# Patient Record
Sex: FEMALE | Race: BLACK OR AFRICAN AMERICAN | NOT HISPANIC OR LATINO | Employment: UNEMPLOYED | URBAN - METROPOLITAN AREA
[De-identification: names, ages, dates, MRNs, and addresses within clinical notes are randomized per-mention and may not be internally consistent; named-entity substitution may affect disease eponyms.]

---

## 2018-05-08 ENCOUNTER — HISTORICAL (OUTPATIENT)
Dept: RADIOLOGY | Facility: HOSPITAL | Age: 43
End: 2018-05-08

## 2018-07-10 ENCOUNTER — HISTORICAL (OUTPATIENT)
Dept: RADIOLOGY | Facility: HOSPITAL | Age: 43
End: 2018-07-10

## 2018-07-25 ENCOUNTER — HISTORICAL (OUTPATIENT)
Dept: PHYSICAL THERAPY | Facility: HOSPITAL | Age: 43
End: 2018-07-25

## 2018-07-27 ENCOUNTER — HISTORICAL (OUTPATIENT)
Dept: PHYSICAL THERAPY | Facility: HOSPITAL | Age: 43
End: 2018-07-27

## 2018-07-30 ENCOUNTER — HISTORICAL (OUTPATIENT)
Dept: PHYSICAL THERAPY | Facility: HOSPITAL | Age: 43
End: 2018-07-30

## 2018-08-02 ENCOUNTER — HISTORICAL (OUTPATIENT)
Dept: PHYSICAL THERAPY | Facility: HOSPITAL | Age: 43
End: 2018-08-02

## 2018-08-08 ENCOUNTER — HISTORICAL (OUTPATIENT)
Dept: PHYSICAL THERAPY | Facility: HOSPITAL | Age: 43
End: 2018-08-08

## 2018-08-10 ENCOUNTER — HISTORICAL (OUTPATIENT)
Dept: PHYSICAL THERAPY | Facility: HOSPITAL | Age: 43
End: 2018-08-10

## 2018-08-21 ENCOUNTER — HISTORICAL (OUTPATIENT)
Dept: PHYSICAL THERAPY | Facility: HOSPITAL | Age: 43
End: 2018-08-21

## 2018-10-11 ENCOUNTER — HISTORICAL (OUTPATIENT)
Dept: PHYSICAL THERAPY | Facility: HOSPITAL | Age: 43
End: 2018-10-11

## 2018-10-15 ENCOUNTER — HISTORICAL (OUTPATIENT)
Dept: PHYSICAL THERAPY | Facility: HOSPITAL | Age: 43
End: 2018-10-15

## 2018-10-18 ENCOUNTER — HISTORICAL (OUTPATIENT)
Dept: PHYSICAL THERAPY | Facility: HOSPITAL | Age: 43
End: 2018-10-18

## 2018-11-15 ENCOUNTER — HISTORICAL (OUTPATIENT)
Dept: PHYSICAL THERAPY | Facility: HOSPITAL | Age: 43
End: 2018-11-15

## 2018-11-16 ENCOUNTER — HISTORICAL (OUTPATIENT)
Dept: PHYSICAL THERAPY | Facility: HOSPITAL | Age: 43
End: 2018-11-16

## 2018-11-19 ENCOUNTER — HISTORICAL (OUTPATIENT)
Dept: PHYSICAL THERAPY | Facility: HOSPITAL | Age: 43
End: 2018-11-19

## 2018-11-27 ENCOUNTER — HISTORICAL (OUTPATIENT)
Dept: PHYSICAL THERAPY | Facility: HOSPITAL | Age: 43
End: 2018-11-27

## 2018-12-05 ENCOUNTER — HISTORICAL (OUTPATIENT)
Dept: PHYSICAL THERAPY | Facility: HOSPITAL | Age: 43
End: 2018-12-05

## 2018-12-06 ENCOUNTER — HISTORICAL (OUTPATIENT)
Dept: PHYSICAL THERAPY | Facility: HOSPITAL | Age: 43
End: 2018-12-06

## 2018-12-11 ENCOUNTER — HISTORICAL (OUTPATIENT)
Dept: PHYSICAL THERAPY | Facility: HOSPITAL | Age: 43
End: 2018-12-11

## 2019-02-15 ENCOUNTER — HISTORICAL (OUTPATIENT)
Dept: RADIOLOGY | Facility: HOSPITAL | Age: 44
End: 2019-02-15

## 2019-05-14 ENCOUNTER — HISTORICAL (OUTPATIENT)
Dept: RADIOLOGY | Facility: HOSPITAL | Age: 44
End: 2019-05-14

## 2020-06-19 ENCOUNTER — HISTORICAL (OUTPATIENT)
Dept: LAB | Facility: HOSPITAL | Age: 45
End: 2020-06-19

## 2020-06-19 LAB
ABS NEUT (OLG): 3.68 X10(3)/MCL (ref 2.1–9.2)
ALBUMIN SERPL-MCNC: 3.6 GM/DL (ref 3.4–5)
ALBUMIN/GLOB SERPL: 0.9 RATIO (ref 1.1–2)
ALP SERPL-CCNC: 92 UNIT/L (ref 46–116)
ALT SERPL-CCNC: 23 UNIT/L (ref 12–78)
AST SERPL-CCNC: 14 UNIT/L (ref 15–37)
BASOPHILS # BLD AUTO: 0 X10(3)/MCL (ref 0–0.2)
BASOPHILS NFR BLD AUTO: 0 %
BILIRUB SERPL-MCNC: 0.6 MG/DL (ref 0.2–1)
BILIRUBIN DIRECT+TOT PNL SERPL-MCNC: 0.17 MG/DL (ref 0–0.2)
BILIRUBIN DIRECT+TOT PNL SERPL-MCNC: 0.43 MG/DL (ref 0–0.8)
BUN SERPL-MCNC: 7.5 MG/DL (ref 7–18)
CALCIUM SERPL-MCNC: 9.3 MG/DL (ref 8.5–10.1)
CHLORIDE SERPL-SCNC: 103 MMOL/L (ref 98–107)
CHOLEST SERPL-MCNC: 257 MG/DL (ref 0–200)
CHOLEST/HDLC SERPL: 3.9 {RATIO} (ref 0–4)
CO2 SERPL-SCNC: 26.6 MMOL/L (ref 21–32)
CREAT SERPL-MCNC: 0.63 MG/DL (ref 0.6–1.3)
EOSINOPHIL # BLD AUTO: 0.1 X10(3)/MCL (ref 0–0.9)
EOSINOPHIL NFR BLD AUTO: 2 %
ERYTHROCYTE [DISTWIDTH] IN BLOOD BY AUTOMATED COUNT: 12.5 % (ref 11.5–17)
FT4I SERPL CALC-MCNC: 2.41
GLOBULIN SER-MCNC: 4.1 GM/DL (ref 2.4–3.5)
GLUCOSE SERPL-MCNC: 102 MG/DL (ref 74–106)
HCT VFR BLD AUTO: 41.1 % (ref 37–47)
HDLC SERPL-MCNC: 66 MG/DL (ref 40–60)
HGB BLD-MCNC: 13.3 GM/DL (ref 12–16)
IMM GRANULOCYTES # BLD AUTO: 0.01 % (ref 0–0.02)
IMM GRANULOCYTES NFR BLD AUTO: 0.2 % (ref 0–0.43)
LDLC SERPL CALC-MCNC: 161 MG/DL (ref 0–129)
LYMPHOCYTES # BLD AUTO: 2.4 X10(3)/MCL (ref 0.6–4.6)
LYMPHOCYTES NFR BLD AUTO: 37 %
MCH RBC QN AUTO: 28.8 PG (ref 27–31)
MCHC RBC AUTO-ENTMCNC: 32.4 GM/DL (ref 33–36)
MCV RBC AUTO: 89 FL (ref 80–94)
MONOCYTES # BLD AUTO: 0.3 X10(3)/MCL (ref 0.1–1.3)
MONOCYTES NFR BLD AUTO: 5 %
NEUTROPHILS # BLD AUTO: 3.68 X10(3)/MCL (ref 1.4–7.9)
NEUTROPHILS NFR BLD AUTO: 56 %
PLATELET # BLD AUTO: 288 X10(3)/MCL (ref 130–400)
PMV BLD AUTO: 9.9 FL (ref 9.4–12.4)
POTASSIUM SERPL-SCNC: 3.9 MMOL/L (ref 3.5–5.1)
PROT SERPL-MCNC: 7.7 GM/DL (ref 6.4–8.2)
RBC # BLD AUTO: 4.62 X10(6)/MCL (ref 4.2–5.4)
SODIUM SERPL-SCNC: 140 MMOL/L (ref 136–145)
T3RU NFR SERPL: 34 % (ref 31–39)
T4 SERPL-MCNC: 7.1 MCG/DL (ref 4.7–13.3)
TRIGL SERPL-MCNC: 149 MG/DL
TSH SERPL-ACNC: 1.29 MIU/ML (ref 0.36–3.74)
VLDLC SERPL CALC-MCNC: 30 MG/DL
WBC # SPEC AUTO: 6.6 X10(3)/MCL (ref 4.5–11.5)

## 2020-07-23 ENCOUNTER — HISTORICAL (OUTPATIENT)
Dept: RADIOLOGY | Facility: HOSPITAL | Age: 45
End: 2020-07-23

## 2021-07-23 ENCOUNTER — HISTORICAL (OUTPATIENT)
Dept: LAB | Facility: HOSPITAL | Age: 46
End: 2021-07-23

## 2021-07-23 LAB
CHOLEST SERPL-MCNC: 257 MG/DL
CHOLEST/HDLC SERPL: 5 {RATIO} (ref 0–5)
DEPRECATED CALCIDIOL+CALCIFEROL SERPL-MC: 11 NG/ML (ref 30–80)
EST. AVERAGE GLUCOSE BLD GHB EST-MCNC: 122.6 MG/DL
HBA1C MFR BLD: 5.9 %
HDLC SERPL-MCNC: 57 MG/DL (ref 35–60)
LDLC SERPL CALC-MCNC: 180 MG/DL (ref 50–140)
TRIGL SERPL-MCNC: 99 MG/DL (ref 37–140)
TSH SERPL-ACNC: 2.08 UIU/ML (ref 0.35–4.94)
VLDLC SERPL CALC-MCNC: 20 MG/DL

## 2022-04-10 ENCOUNTER — HISTORICAL (OUTPATIENT)
Dept: ADMINISTRATIVE | Facility: HOSPITAL | Age: 47
End: 2022-04-10
Payer: MEDICAID

## 2022-04-26 VITALS
DIASTOLIC BLOOD PRESSURE: 86 MMHG | HEIGHT: 65 IN | SYSTOLIC BLOOD PRESSURE: 125 MMHG | BODY MASS INDEX: 36.36 KG/M2 | WEIGHT: 218.25 LBS | OXYGEN SATURATION: 97 %

## 2022-09-09 DIAGNOSIS — R76.8 ANA POSITIVE: Primary | ICD-10-CM

## 2022-11-10 ENCOUNTER — TELEPHONE (OUTPATIENT)
Dept: RHEUMATOLOGY | Facility: CLINIC | Age: 47
End: 2022-11-10
Payer: MEDICAID

## 2022-11-10 NOTE — TELEPHONE ENCOUNTER
----- Message from Sue Weber sent at 11/10/2022  3:18 PM CST -----  Contact: Patient  Ramya L Nidhi would like a call back at [phone], in regards to scheduling an appointment. Pt states a referral was sent over on last week.

## 2023-02-01 DIAGNOSIS — R76.8 POSITIVE ANTINUCLEAR ANTIBODY: Primary | ICD-10-CM

## 2023-07-03 DIAGNOSIS — I10 HYPERTENSION, UNSPECIFIED TYPE: Primary | ICD-10-CM

## 2023-07-03 RX ORDER — BENAZEPRIL HYDROCHLORIDE 40 MG/1
TABLET ORAL
Qty: 30 TABLET | Refills: 0 | Status: SHIPPED | OUTPATIENT
Start: 2023-07-03 | End: 2023-08-07

## 2023-08-04 DIAGNOSIS — I10 HYPERTENSION, UNSPECIFIED TYPE: ICD-10-CM

## 2023-08-07 RX ORDER — BENAZEPRIL HYDROCHLORIDE 40 MG/1
TABLET ORAL
Qty: 30 TABLET | Refills: 0 | Status: SHIPPED | OUTPATIENT
Start: 2023-08-07 | End: 2023-09-06 | Stop reason: SDUPTHER

## 2023-08-23 DIAGNOSIS — Z00.00 WELLNESS EXAMINATION: Primary | ICD-10-CM

## 2023-09-02 ENCOUNTER — LAB VISIT (OUTPATIENT)
Dept: LAB | Facility: HOSPITAL | Age: 48
End: 2023-09-02
Attending: NURSE PRACTITIONER
Payer: MEDICAID

## 2023-09-02 DIAGNOSIS — Z00.00 WELLNESS EXAMINATION: ICD-10-CM

## 2023-09-02 LAB
ALBUMIN SERPL-MCNC: 3.5 G/DL (ref 3.5–5)
ALBUMIN/GLOB SERPL: 0.9 RATIO (ref 1.1–2)
ALP SERPL-CCNC: 94 UNIT/L (ref 40–150)
ALT SERPL-CCNC: 16 UNIT/L (ref 0–55)
AST SERPL-CCNC: 15 UNIT/L (ref 5–34)
BASOPHILS # BLD AUTO: 0.04 X10(3)/MCL
BASOPHILS NFR BLD AUTO: 0.6 %
BILIRUB SERPL-MCNC: 0.6 MG/DL
BUN SERPL-MCNC: 5.9 MG/DL (ref 7–18.7)
CALCIUM SERPL-MCNC: 9.5 MG/DL (ref 8.4–10.2)
CHLORIDE SERPL-SCNC: 103 MMOL/L (ref 98–107)
CHOLEST SERPL-MCNC: 273 MG/DL
CHOLEST/HDLC SERPL: 6 {RATIO} (ref 0–5)
CO2 SERPL-SCNC: 29 MMOL/L (ref 22–29)
CREAT SERPL-MCNC: 0.68 MG/DL (ref 0.55–1.02)
DEPRECATED CALCIDIOL+CALCIFEROL SERPL-MC: 42.2 NG/ML (ref 30–80)
EOSINOPHIL # BLD AUTO: 0.18 X10(3)/MCL (ref 0–0.9)
EOSINOPHIL NFR BLD AUTO: 2.8 %
ERYTHROCYTE [DISTWIDTH] IN BLOOD BY AUTOMATED COUNT: 12.6 % (ref 11.5–17)
EST. AVERAGE GLUCOSE BLD GHB EST-MCNC: 114 MG/DL
GFR SERPLBLD CREATININE-BSD FMLA CKD-EPI: >60 MLS/MIN/1.73/M2
GLOBULIN SER-MCNC: 4 GM/DL (ref 2.4–3.5)
GLUCOSE SERPL-MCNC: 101 MG/DL (ref 74–100)
HBA1C MFR BLD: 5.6 %
HCT VFR BLD AUTO: 39.9 % (ref 37–47)
HDLC SERPL-MCNC: 48 MG/DL (ref 35–60)
HGB BLD-MCNC: 12.6 G/DL (ref 12–16)
IMM GRANULOCYTES # BLD AUTO: 0.02 X10(3)/MCL (ref 0–0.04)
IMM GRANULOCYTES NFR BLD AUTO: 0.3 %
LDLC SERPL CALC-MCNC: 191 MG/DL (ref 50–140)
LYMPHOCYTES # BLD AUTO: 2.6 X10(3)/MCL (ref 0.6–4.6)
LYMPHOCYTES NFR BLD AUTO: 40.4 %
MCH RBC QN AUTO: 27.9 PG (ref 27–31)
MCHC RBC AUTO-ENTMCNC: 31.6 G/DL (ref 33–36)
MCV RBC AUTO: 88.5 FL (ref 80–94)
MONOCYTES # BLD AUTO: 0.33 X10(3)/MCL (ref 0.1–1.3)
MONOCYTES NFR BLD AUTO: 5.1 %
NEUTROPHILS # BLD AUTO: 3.26 X10(3)/MCL (ref 2.1–9.2)
NEUTROPHILS NFR BLD AUTO: 50.8 %
PLATELET # BLD AUTO: 286 X10(3)/MCL (ref 130–400)
PMV BLD AUTO: 10 FL (ref 7.4–10.4)
POTASSIUM SERPL-SCNC: 4 MMOL/L (ref 3.5–5.1)
PROT SERPL-MCNC: 7.5 GM/DL (ref 6.4–8.3)
RBC # BLD AUTO: 4.51 X10(6)/MCL (ref 4.2–5.4)
SODIUM SERPL-SCNC: 139 MMOL/L (ref 136–145)
TRIGL SERPL-MCNC: 171 MG/DL (ref 37–140)
TSH SERPL-ACNC: 1.9 UIU/ML (ref 0.35–4.94)
VLDLC SERPL CALC-MCNC: 34 MG/DL
WBC # SPEC AUTO: 6.43 X10(3)/MCL (ref 4.5–11.5)

## 2023-09-02 PROCEDURE — 80061 LIPID PANEL: CPT

## 2023-09-02 PROCEDURE — 85025 COMPLETE CBC W/AUTO DIFF WBC: CPT

## 2023-09-02 PROCEDURE — 80053 COMPREHEN METABOLIC PANEL: CPT

## 2023-09-02 PROCEDURE — 36415 COLL VENOUS BLD VENIPUNCTURE: CPT

## 2023-09-02 PROCEDURE — 83036 HEMOGLOBIN GLYCOSYLATED A1C: CPT

## 2023-09-02 PROCEDURE — 84443 ASSAY THYROID STIM HORMONE: CPT

## 2023-09-02 PROCEDURE — 82306 VITAMIN D 25 HYDROXY: CPT

## 2023-09-05 ENCOUNTER — OFFICE VISIT (OUTPATIENT)
Dept: FAMILY MEDICINE | Facility: CLINIC | Age: 48
End: 2023-09-05
Payer: MEDICAID

## 2023-09-05 VITALS
WEIGHT: 222.38 LBS | OXYGEN SATURATION: 97 % | RESPIRATION RATE: 18 BRPM | DIASTOLIC BLOOD PRESSURE: 89 MMHG | HEART RATE: 75 BPM | TEMPERATURE: 98 F | BODY MASS INDEX: 37.05 KG/M2 | HEIGHT: 65 IN | SYSTOLIC BLOOD PRESSURE: 147 MMHG

## 2023-09-05 DIAGNOSIS — F32.A DEPRESSION, UNSPECIFIED DEPRESSION TYPE: ICD-10-CM

## 2023-09-05 DIAGNOSIS — Z00.00 WELL ADULT EXAM: Primary | ICD-10-CM

## 2023-09-05 DIAGNOSIS — E78.2 MIXED HYPERLIPIDEMIA: ICD-10-CM

## 2023-09-05 DIAGNOSIS — Z12.31 BREAST CANCER SCREENING BY MAMMOGRAM: ICD-10-CM

## 2023-09-05 DIAGNOSIS — I10 HYPERTENSION, UNSPECIFIED TYPE: ICD-10-CM

## 2023-09-05 DIAGNOSIS — Z12.12 ENCOUNTER FOR COLORECTAL CANCER SCREENING: ICD-10-CM

## 2023-09-05 DIAGNOSIS — Z13.31 POSITIVE DEPRESSION SCREENING: ICD-10-CM

## 2023-09-05 DIAGNOSIS — Z12.11 ENCOUNTER FOR COLORECTAL CANCER SCREENING: ICD-10-CM

## 2023-09-05 DIAGNOSIS — R76.8 POSITIVE ANA (ANTINUCLEAR ANTIBODY): ICD-10-CM

## 2023-09-05 PROCEDURE — 1160F RVW MEDS BY RX/DR IN RCRD: CPT | Mod: CPTII,,, | Performed by: NURSE PRACTITIONER

## 2023-09-05 PROCEDURE — 99396 PR PREVENTIVE VISIT,EST,40-64: ICD-10-PCS | Mod: ,,, | Performed by: NURSE PRACTITIONER

## 2023-09-05 PROCEDURE — 3079F DIAST BP 80-89 MM HG: CPT | Mod: CPTII,,, | Performed by: NURSE PRACTITIONER

## 2023-09-05 PROCEDURE — 4010F ACE/ARB THERAPY RXD/TAKEN: CPT | Mod: CPTII,,, | Performed by: NURSE PRACTITIONER

## 2023-09-05 PROCEDURE — 1159F PR MEDICATION LIST DOCUMENTED IN MEDICAL RECORD: ICD-10-PCS | Mod: CPTII,,, | Performed by: NURSE PRACTITIONER

## 2023-09-05 PROCEDURE — 99396 PREV VISIT EST AGE 40-64: CPT | Mod: ,,, | Performed by: NURSE PRACTITIONER

## 2023-09-05 PROCEDURE — 3077F PR MOST RECENT SYSTOLIC BLOOD PRESSURE >= 140 MM HG: ICD-10-PCS | Mod: CPTII,,, | Performed by: NURSE PRACTITIONER

## 2023-09-05 PROCEDURE — 3044F PR MOST RECENT HEMOGLOBIN A1C LEVEL <7.0%: ICD-10-PCS | Mod: CPTII,,, | Performed by: NURSE PRACTITIONER

## 2023-09-05 PROCEDURE — 3079F PR MOST RECENT DIASTOLIC BLOOD PRESSURE 80-89 MM HG: ICD-10-PCS | Mod: CPTII,,, | Performed by: NURSE PRACTITIONER

## 2023-09-05 PROCEDURE — 3077F SYST BP >= 140 MM HG: CPT | Mod: CPTII,,, | Performed by: NURSE PRACTITIONER

## 2023-09-05 PROCEDURE — 1159F MED LIST DOCD IN RCRD: CPT | Mod: CPTII,,, | Performed by: NURSE PRACTITIONER

## 2023-09-05 PROCEDURE — 3044F HG A1C LEVEL LT 7.0%: CPT | Mod: CPTII,,, | Performed by: NURSE PRACTITIONER

## 2023-09-05 PROCEDURE — 1160F PR REVIEW ALL MEDS BY PRESCRIBER/CLIN PHARMACIST DOCUMENTED: ICD-10-PCS | Mod: CPTII,,, | Performed by: NURSE PRACTITIONER

## 2023-09-05 PROCEDURE — 4010F PR ACE/ARB THEARPY RXD/TAKEN: ICD-10-PCS | Mod: CPTII,,, | Performed by: NURSE PRACTITIONER

## 2023-09-05 PROCEDURE — 3008F PR BODY MASS INDEX (BMI) DOCUMENTED: ICD-10-PCS | Mod: CPTII,,, | Performed by: NURSE PRACTITIONER

## 2023-09-05 PROCEDURE — 3008F BODY MASS INDEX DOCD: CPT | Mod: CPTII,,, | Performed by: NURSE PRACTITIONER

## 2023-09-05 RX ORDER — MUPIROCIN 20 MG/G
OINTMENT TOPICAL 3 TIMES DAILY
Qty: 22 G | Refills: 0 | Status: SHIPPED | OUTPATIENT
Start: 2023-09-05 | End: 2023-09-10

## 2023-09-05 RX ORDER — ROSUVASTATIN CALCIUM 10 MG/1
10 TABLET, COATED ORAL NIGHTLY
Qty: 90 TABLET | Refills: 3 | Status: SHIPPED | OUTPATIENT
Start: 2023-09-05 | End: 2024-09-04

## 2023-09-05 NOTE — PROGRESS NOTES
"Patient ID: [id number]     Chief Complaint: Annual Exam      HPI:     Ramya Terrell is a 48 y.o. female here today for an annual wellness visit.  Patient reports she was referred to rheumatology in [place] by her neurologist for a positive CHRISTINE .  She states that the rheumatologist told her she is unable to treat this and will need to see someone else.       Health Maintenance   Topic Date Due    Hepatitis C Screening  Never done    TETANUS VACCINE  05/28/2001    Colorectal Cancer Screening  Never done    Mammogram  07/23/2021    Lipid Panel  09/02/2028     Dental Exam - Recommend biannually  Vaccinations -          Past Medical History:  has a past medical history of Anxiety disorder, unspecified, Depression, and Essential (primary) hypertension.    Surgical History:  has a past surgical history that includes Hysterectomy.    Family History: family history is not on file.    Social History:  reports that she has never smoked. She has never used smokeless tobacco. She reports current alcohol use of about 12.0 standard drinks of alcohol per week. She reports that she does not use drugs.        Patient is allergic to penicillins.     Patient Care Team:  Elham Richter FNP as PCP - General (Family Medicine)       Subjective:     Review of Systems    12 point review of systems conducted, negative except as stated in the history of present illness. See HPI for details.      Objective:     Visit Vitals  BP (!) 147/89 (BP Location: Left arm, Patient Position: Sitting)   Pulse 75   Temp 97.8 °F (36.6 °C) (Oral)   Resp 18   Ht 5' 4.96" (1.65 m)   Wt 100.9 kg (222 lb 6.4 oz)   LMP  (LMP Unknown)   SpO2 97%   BMI 37.05 kg/m²       Physical Exam    General: Alert and oriented, No acute distress.  Head: Normocephalic, Atraumatic.  Eye: Pupils are equal, round and reactive to light, Extraocular movements are intact, Sclera non-icteric.  Ears/Nose/Throat: Normal, Mucosa moist,Clear.  Neck/Thyroid: Supple, Non-tender, No " carotid bruit, No lymphadenopathy, No JVD, Full range of motion.  Respiratory: Clear to auscultation bilaterally; No wheezes, rales or rhonchi,Non-labored respirations, Symmetrical chest wall expansion.  Cardiovascular: Regular rate and rhythm, S1/S2 normal, No murmurs, rubs or gallops.  Gastrointestinal: Soft, Non-tender, Non-distended, Normal bowel sounds, No palpable organomegaly.  Musculoskeletal: Normal range of motion.  Integumentary: Warm, Dry, Intact, No suspicious lesions or rashes.  Extremities: No clubbing, cyanosis or edema  Neurologic: No focal deficits, Cranial Nerves II-XII are grossly intact, Motor strength normal upper and lower extremities, Sensory exam intact.  Psychiatric: Normal interaction, Coherent speech, Euthymic mood, Appropriate affect     Labs Reviewed:     Chemistry:  Lab Results   Component Value Date     09/02/2023    K 4.0 09/02/2023    CHLORIDE 103 09/02/2023    BUN 5.9 (L) 09/02/2023    CREATININE 0.68 09/02/2023    EGFRNORACEVR >60 09/02/2023    GLUCOSE 101 (H) 09/02/2023    CALCIUM 9.5 09/02/2023    ALKPHOS 94 09/02/2023    LABPROT 7.5 09/02/2023    ALBUMIN 3.5 09/02/2023    BILIDIR 0.2 05/30/2021    IBILI 0.40 05/30/2021    AST 15 09/02/2023    ALT 16 09/02/2023    ESTQAJOJ10EP 42.2 09/02/2023    TSH 1.904 09/02/2023        Lab Results   Component Value Date    HGBA1C 5.6 09/02/2023        Hematology:  Lab Results   Component Value Date    WBC 6.43 09/02/2023    HGB 12.6 09/02/2023    HCT 39.9 09/02/2023     09/02/2023       Lipid Panel:  Lab Results   Component Value Date    CHOL 273 (H) 09/02/2023    HDL 48 09/02/2023    .00 (H) 09/02/2023    TRIG 171 (H) 09/02/2023    TOTALCHOLEST 6 (H) 09/02/2023        Urine:  Lab Results   Component Value Date    APPEARANCEUA Slightly Cloudy 06/16/2020    PROTEINUA Negative 06/16/2020    LEUKOCYTESUR Negative 06/16/2020    RBCUA 0-2 06/16/2020    WBCUA 0-2 06/16/2020    BACTERIA None Seen 06/16/2020          Assessment:        ICD-10-CM ICD-9-CM   1. Well adult exam  Z00.00 V70.0   2. Hypertension, unspecified type  I10 401.9   3. Mixed hyperlipidemia  E78.2 272.2   4. Depression, unspecified depression type  F32.A 311   5. Positive depression screening  Z13.31 796.4   6. Positive CHRISTINE (antinuclear antibody)  R76.8 795.79   7. Encounter for colorectal cancer screening  Z12.11 V76.51    Z12.12 V76.41   8. Breast cancer screening by mammogram  Z12.31 V76.12        Plan:     1. Well adult exam  Healthy lifestyle discussed.  Positive support system encouraged.  Breast cancer screening ordered.  Colon cancer screening ordered.    2. Hypertension, unspecified type  Blood pressure 148/98.  Repeat blood pressure 147/89.  Patient denied take blood pressure medication yet this morning.  She advised patient to take medication upon returning home.  Return to clinic in 2 weeks for nurse visit for blood pressure recheck.  Advised patient to take medication prior to appointment.  Low Sodium Diet (DASH Diet - Less than 2 grams of sodium per day).  Monitor blood pressure daily and log. Report consistent numbers greater than 140/90.  Maintain healthy weight with goal BMI <30. Exercise 30 minutes per day, 5 days per week.  Smoking cessation encouraged to aid in BP reduction.      3. Mixed hyperlipidemia  Lab Results   Component Value Date    .00 (H) 09/02/2023    TRIG 171 (H) 09/02/2023    HDL 48 09/02/2023    TOTALCHOLEST 6 (H) 09/02/2023     Continue  Stressed importance of dietary modifications. Follow a low cholesterol, low saturated fat diet with less that 200mg of cholesterol a day.  Avoid fried foods and high saturated fats (high saturated fats less than 7% of calories).  Add Flax Seed/Fish Oil supplements to diet. Increase dietary fiber.  Regular exercise can reduce LDL and raise HDL. Stressed importance of physical activity 5 times per week for 30 minutes per day.     - rosuvastatin (CRESTOR) 10 MG tablet; Take 1 tablet (10 mg total) by  mouth every evening.  Dispense: 90 tablet; Refill: 3  - Comprehensive Metabolic Panel; Future  - Lipid Panel; Future    4. Depression, unspecified depression type  Stable.  Continue current management.  Report to ED with any suicidal or homicidal ideations.  Patient is managed by mental health.  Advised to call in scheduled follow-up appointment with mental health professional.      5. Positive depression screening  Stable.  Continue current management.  Report to ED with any suicidal or homicidal ideations.  Patient is managed by mental health.  Advised to call in scheduled follow-up appointment with mental health professional.    6. Positive CHRISTINE (antinuclear antibody)  - Ambulatory referral/consult to Rheumatology; Future    7. Encounter for colorectal cancer screening  - Ambulatory referral/consult to Gastroenterology; Future    8. Breast cancer screening by mammogram  - Mammo Digital Screening Bilat w/ [facility]; Future      Follow up in about 2 weeks (around 9/19/2023) for HTN, Nurse Visit. In addition to their scheduled follow up, the patient has also been instructed to follow up on as needed basis.     Future Appointments   Date Time Provider Department Center   9/19/2023  9:00 AM NURSE, [facility] FAMILY MEDIC[facility]   12/12/2023  9:15 AM Elham Richter FNP [facility]   9/9/2024  9:30 AM Elham Richter FNP [facility]        ELLEN Strickland

## 2023-09-06 DIAGNOSIS — I10 HYPERTENSION, UNSPECIFIED TYPE: ICD-10-CM

## 2023-09-06 RX ORDER — BENAZEPRIL HYDROCHLORIDE 40 MG/1
TABLET ORAL
Qty: 30 TABLET | Refills: 3 | Status: SHIPPED | OUTPATIENT
Start: 2023-09-06 | End: 2024-01-02

## 2023-09-27 DIAGNOSIS — Z00.00 WELL ADULT EXAM: Primary | ICD-10-CM

## 2023-09-28 ENCOUNTER — PATIENT OUTREACH (OUTPATIENT)
Dept: ADMINISTRATIVE | Facility: HOSPITAL | Age: 48
End: 2023-09-28
Payer: MEDICAID

## 2023-09-28 NOTE — PROGRESS NOTES
Population Health Outreach.    09/05/2023-referred to Dr. Don. Communication sent 9/28/23-no record of colonoscopy, only EGD report.

## 2023-09-28 NOTE — LETTER
AUTHORIZATION FOR RELEASE OF   CONFIDENTIAL INFORMATION    Dear Dr. Don,    We are seeing Ramya Terrell, date of birth 1975, in the clinic at [facility]. Elham Richter FNP is the patient's PCP. Ramya Terrell has an outstanding lab/procedure at the time we reviewed her chart. In order to help keep her health information updated, she has authorized us to request the following medical record(s):          ( X )  COLONOSCOPY?    2023-referred to Dr. Don.       Please fax records to [facility], [place], [place] D, FNP, [phone]       If you have any questions, please contact Kianna at [phone]           Patient Name: Ramya eTrrell  : 1975  Patient Phone #: [phone]

## 2023-10-09 ENCOUNTER — HOSPITAL ENCOUNTER (EMERGENCY)
Facility: HOSPITAL | Age: 48
Discharge: HOME | End: 2023-10-09
Attending: FAMILY MEDICINE
Payer: MEDICAID

## 2023-10-09 VITALS
TEMPERATURE: 99 F | SYSTOLIC BLOOD PRESSURE: 123 MMHG | DIASTOLIC BLOOD PRESSURE: 45 MMHG | HEIGHT: 64 IN | OXYGEN SATURATION: 98 % | BODY MASS INDEX: 37.56 KG/M2 | HEART RATE: 103 BPM | WEIGHT: 220 LBS | RESPIRATION RATE: 20 BRPM

## 2023-10-09 DIAGNOSIS — R06.2 WHEEZING ON BOTH SIDES OF CHEST: ICD-10-CM

## 2023-10-09 DIAGNOSIS — J06.9 VIRAL URI WITH COUGH: Primary | ICD-10-CM

## 2023-10-09 LAB
FLUAV AG UPPER RESP QL IA.RAPID: NOT DETECTED
FLUBV AG UPPER RESP QL IA.RAPID: NOT DETECTED
SARS-COV-2 RNA RESP QL NAA+PROBE: NOT DETECTED

## 2023-10-09 PROCEDURE — 25000003 PHARM REV CODE 250

## 2023-10-09 PROCEDURE — 0240U COVID/FLU A&B PCR: CPT | Performed by: FAMILY MEDICINE

## 2023-10-09 PROCEDURE — 94760 N-INVAS EAR/PLS OXIMETRY 1: CPT

## 2023-10-09 PROCEDURE — 63600175 PHARM REV CODE 636 W HCPCS

## 2023-10-09 PROCEDURE — 94640 AIRWAY INHALATION TREATMENT: CPT

## 2023-10-09 PROCEDURE — 25000242 PHARM REV CODE 250 ALT 637 W/ HCPCS

## 2023-10-09 PROCEDURE — 99284 EMERGENCY DEPT VISIT MOD MDM: CPT | Mod: 25

## 2023-10-09 RX ORDER — IPRATROPIUM BROMIDE AND ALBUTEROL SULFATE 2.5; .5 MG/3ML; MG/3ML
3 SOLUTION RESPIRATORY (INHALATION)
Status: COMPLETED | OUTPATIENT
Start: 2023-10-09 | End: 2023-10-09

## 2023-10-09 RX ORDER — ALBUTEROL SULFATE 2.5 MG/.5ML
2.5 SOLUTION RESPIRATORY (INHALATION) EVERY 4 HOURS PRN
Qty: 2 EACH | Refills: 0 | Status: SHIPPED | OUTPATIENT
Start: 2023-10-09 | End: 2023-11-08

## 2023-10-09 RX ORDER — KETOROLAC TROMETHAMINE 10 MG/1
10 TABLET, FILM COATED ORAL
Status: COMPLETED | OUTPATIENT
Start: 2023-10-09 | End: 2023-10-09

## 2023-10-09 RX ORDER — NEBULIZER AND COMPRESSOR
1 EACH MISCELLANEOUS EVERY 12 HOURS
Qty: 1 EACH | Refills: 3 | Status: SHIPPED | OUTPATIENT
Start: 2023-10-09

## 2023-10-09 RX ORDER — PREDNISONE 10 MG/1
10 TABLET ORAL DAILY
Qty: 21 TABLET | Refills: 0 | Status: SHIPPED | OUTPATIENT
Start: 2023-10-09

## 2023-10-09 RX ORDER — PREDNISONE 20 MG/1
60 TABLET ORAL
Status: COMPLETED | OUTPATIENT
Start: 2023-10-09 | End: 2023-10-09

## 2023-10-09 RX ADMIN — KETOROLAC TROMETHAMINE 10 MG: 10 TABLET, FILM COATED ORAL at 02:10

## 2023-10-09 RX ADMIN — IPRATROPIUM BROMIDE AND ALBUTEROL SULFATE 3 ML: .5; 3 SOLUTION RESPIRATORY (INHALATION) at 02:10

## 2023-10-09 RX ADMIN — PREDNISONE 60 MG: 20 TABLET ORAL at 02:10

## 2023-10-09 NOTE — DISCHARGE INSTRUCTIONS
Patient will be discharged home.  Follow up with your primary care provider for recheck.  Return ER for any worsening symptoms.  Use nebulizer machine as needed

## 2023-10-09 NOTE — ED PROVIDER NOTES
Encounter Date: 10/9/2023       History     Chief Complaint   Patient presents with    Cough     Complains of coughing, congestion, fever, and wheezing since Thursday     Cough and flu-like symptoms    The history is provided by the patient. No  was used.     Review of patient's allergies indicates:   Allergen Reactions    Penicillins      Past Medical History:   Diagnosis Date    Anxiety disorder, unspecified     Depression     Essential (primary) hypertension      Past Surgical History:   Procedure Laterality Date    HYSTERECTOMY       No family history on file.  Social History     Tobacco Use    Smoking status: Never    Smokeless tobacco: Never   Substance Use Topics    Alcohol use: Yes     Alcohol/week: 12.0 standard drinks of alcohol     Types: 12 Cans of beer per week    Drug use: Never     Review of Systems   Constitutional:  Positive for fever.   HENT:  Positive for congestion, postnasal drip, rhinorrhea, sinus pressure and sinus pain.    Eyes: Negative.    Respiratory:  Positive for cough and shortness of breath.    Cardiovascular: Negative.    Gastrointestinal: Negative.    Endocrine: Negative.    Genitourinary: Negative.    Musculoskeletal: Negative.    Skin: Negative.    Allergic/Immunologic: Negative.    Neurological: Negative.    Hematological: Negative.    Psychiatric/Behavioral: Negative.     All other systems reviewed and are negative.      Physical Exam     Initial Vitals [10/09/23 1431]   BP Pulse Resp Temp SpO2   (!) 123/45 98 20 98.9 °F (37.2 °C) 95 %      MAP       --         Physical Exam    Constitutional: She appears well-developed and well-nourished.   HENT:   Head: Normocephalic and atraumatic.   Right Ear: External ear normal.   Left Ear: External ear normal.   Nose: Nose normal.   Mouth/Throat: Oropharynx is clear and moist.   Eyes: Conjunctivae and EOM are normal. Pupils are equal, round, and reactive to light.   Neck: Neck supple.   Normal range of  motion.  Cardiovascular:  Normal rate, regular rhythm, normal heart sounds and intact distal pulses.           Pulmonary/Chest: She has wheezes. She exhibits tenderness.   Abdominal: Abdomen is soft. Bowel sounds are normal.   Musculoskeletal:         General: Normal range of motion.      Cervical back: Normal range of motion and neck supple.     Neurological: She is alert and oriented to person, place, and time. She has normal strength and normal reflexes. GCS score is 15. GCS eye subscore is 4. GCS verbal subscore is 5. GCS motor subscore is 6.   Skin: Skin is warm and dry. Capillary refill takes less than 2 seconds.   Psychiatric: She has a normal mood and affect. Her behavior is normal. Judgment and thought content normal.         ED Course   Procedures  Labs Reviewed   COVID/FLU A&B PCR - Normal    Narrative:     The Xpert Xpress SARS-CoV-2/FLU/RSV plus is a rapid, multiplexed real-time PCR test intended for the simultaneous qualitative detection and differentiation of SARS-CoV-2, Influenza A, Influenza B, and respiratory syncytial virus (RSV) viral RNA in either nasopharyngeal swab or nasal swab specimens.                Imaging Results              X-Ray Chest PA And Lateral (Final result)  Result time 10/09/23 16:14:20      Final result by Paulino Brody MD (10/09/23 16:14:20)                   Impression:      No acute chest disease is identified.      Electronically signed by: Paulino Brody  Date:    10/09/2023  Time:    16:14               Narrative:    EXAMINATION:  XR CHEST PA AND LATERAL    CLINICAL HISTORY:  , Wheezing.    COMPARISON:  May 14, 2019    FINDINGS:  No alveolar consolidation, effusion, or pneumothorax is seen.   The thoracic aorta is normal  cardiac silhouette, central pulmonary vessels and mediastinum are normal in size and are grossly unremarkable.   visualized osseous structures are grossly unremarkable.                                       Medications   predniSONE tablet 60  mg (60 mg Oral Given 10/9/23 1450)   ketorolac tablet 10 mg (10 mg Oral Given 10/9/23 1450)   albuterol-ipratropium 2.5 mg-0.5 mg/3 mL nebulizer solution 3 mL (3 mLs Nebulization Given 10/9/23 1458)     Medical Decision Making  Awake alert and oriented mild distress 48-year-old female presents ER with 3-4 day history of fever or body aches and productive cough.  Head atraumatic.  Tenderness to the maxillary frontal sinuses.  Nasal congestion.  Bilateral ear canals patent without drainage.  TMs intact.  PERRLA, EOMI.  Bilateral breath sounds with expiratory wheezing.  Cough productive.  No relief with the use of inhaler at home.  Abdomen is soft nontender.  Generalized body aches.  Moves all extremities well vital signs stable.  GCS 15 cranial nerves 2-12 intact neuro focal intact.    Diff. Dx:  Flu/Covid/Asthma Exacerbation        Amount and/or Complexity of Data Reviewed  Labs: ordered.     Details: Covid-  Radiology: ordered.     Details: Negative chest.    Risk  Prescription drug management.                               Clinical Impression:   Final diagnoses:  [R06.2] Wheezing on both sides of chest  [J06.9] Viral URI with cough (Primary)        ED Disposition Condition    Discharge Stable          ED Prescriptions       Medication Sig Dispense Start Date End Date Auth. Provider    nebulizer and compressor Yennifer 1 each by Misc.(Non-Drug; Combo Route) route every 12 (twelve) hours. 1 each 10/9/2023 -- Ruchi Barahona NP    albuterol sulfate 2.5 mg/0.5 mL Nebu Take 2.5 mg by nebulization every 4 (four) hours as needed. Rescue 2 each 10/9/2023 11/8/2023 Ruchi Barahona NP    predniSONE (DELTASONE) 10 MG tablet Take 1 tablet (10 mg total) by mouth once daily. Take 4 tabs x 3 days, then take 2 tabs x 3 days, then take 1 tab x 3 days. 21 tablet 10/9/2023 -- Ruchi Barahona NP          Follow-up Information       Follow up With Specialties Details Why Contact Elham Parson, P Family Medicine In 2 days As  needed [address]  [address]  [place] [address]  [phone]      Elham Richter, [facility]   [address]  [address]  [place] [address]  [phone]               Ruchi Barahona NP  10/09/23 1542       Ruchi Barahona NP  10/09/23 1800

## 2023-10-10 ENCOUNTER — TELEPHONE (OUTPATIENT)
Dept: FAMILY MEDICINE | Facility: CLINIC | Age: 48
End: 2023-10-10
Payer: MEDICAID

## 2023-10-10 NOTE — TELEPHONE ENCOUNTER
----- Message from Cinthia Deglado sent at 10/10/2023  2:18 PM CDT -----  Regarding: Needs Med Advice  .Type:  Needs Medical Advice    Who Called: pt  Symptoms (please be specific): ER Follow up   How long has patient had these symptoms:  coughing symptoms  Pharmacy name and phone #:  [facility] [place]   Would the patient rather a call back or a response via [facility]?   Best Call Back Number: [phone]  Additional Information: pt states she went to the ER in [facility] on 10/09 and she's not feeling any better, offered an follow up appointment on 10/12.. pt declined appointment and stated that when she's sick that I  can't get an appointment with my doctor, please advise

## 2023-10-13 ENCOUNTER — OFFICE VISIT (OUTPATIENT)
Dept: FAMILY MEDICINE | Facility: CLINIC | Age: 48
End: 2023-10-13
Payer: MEDICAID

## 2023-10-13 VITALS
DIASTOLIC BLOOD PRESSURE: 88 MMHG | HEART RATE: 88 BPM | WEIGHT: 216.69 LBS | TEMPERATURE: 98 F | BODY MASS INDEX: 36.99 KG/M2 | HEIGHT: 64 IN | SYSTOLIC BLOOD PRESSURE: 137 MMHG | OXYGEN SATURATION: 97 % | RESPIRATION RATE: 18 BRPM

## 2023-10-13 DIAGNOSIS — J01.10 ACUTE FRONTAL SINUSITIS, RECURRENCE NOT SPECIFIED: Primary | ICD-10-CM

## 2023-10-13 DIAGNOSIS — J40 BRONCHITIS: ICD-10-CM

## 2023-10-13 PROCEDURE — 3044F PR MOST RECENT HEMOGLOBIN A1C LEVEL <7.0%: ICD-10-PCS | Mod: CPTII,,, | Performed by: NURSE PRACTITIONER

## 2023-10-13 PROCEDURE — 3008F PR BODY MASS INDEX (BMI) DOCUMENTED: ICD-10-PCS | Mod: CPTII,,, | Performed by: NURSE PRACTITIONER

## 2023-10-13 PROCEDURE — 1159F PR MEDICATION LIST DOCUMENTED IN MEDICAL RECORD: ICD-10-PCS | Mod: CPTII,,, | Performed by: NURSE PRACTITIONER

## 2023-10-13 PROCEDURE — 3044F HG A1C LEVEL LT 7.0%: CPT | Mod: CPTII,,, | Performed by: NURSE PRACTITIONER

## 2023-10-13 PROCEDURE — 99213 PR OFFICE/OUTPT VISIT, EST, LEVL III, 20-29 MIN: ICD-10-PCS | Mod: ,,, | Performed by: NURSE PRACTITIONER

## 2023-10-13 PROCEDURE — 3079F PR MOST RECENT DIASTOLIC BLOOD PRESSURE 80-89 MM HG: ICD-10-PCS | Mod: CPTII,,, | Performed by: NURSE PRACTITIONER

## 2023-10-13 PROCEDURE — 3075F PR MOST RECENT SYSTOLIC BLOOD PRESS GE 130-139MM HG: ICD-10-PCS | Mod: CPTII,,, | Performed by: NURSE PRACTITIONER

## 2023-10-13 PROCEDURE — 1159F MED LIST DOCD IN RCRD: CPT | Mod: CPTII,,, | Performed by: NURSE PRACTITIONER

## 2023-10-13 PROCEDURE — 3079F DIAST BP 80-89 MM HG: CPT | Mod: CPTII,,, | Performed by: NURSE PRACTITIONER

## 2023-10-13 PROCEDURE — 4010F ACE/ARB THERAPY RXD/TAKEN: CPT | Mod: CPTII,,, | Performed by: NURSE PRACTITIONER

## 2023-10-13 PROCEDURE — 4010F PR ACE/ARB THEARPY RXD/TAKEN: ICD-10-PCS | Mod: CPTII,,, | Performed by: NURSE PRACTITIONER

## 2023-10-13 PROCEDURE — 1160F PR REVIEW ALL MEDS BY PRESCRIBER/CLIN PHARMACIST DOCUMENTED: ICD-10-PCS | Mod: CPTII,,, | Performed by: NURSE PRACTITIONER

## 2023-10-13 PROCEDURE — 1160F RVW MEDS BY RX/DR IN RCRD: CPT | Mod: CPTII,,, | Performed by: NURSE PRACTITIONER

## 2023-10-13 PROCEDURE — 3075F SYST BP GE 130 - 139MM HG: CPT | Mod: CPTII,,, | Performed by: NURSE PRACTITIONER

## 2023-10-13 PROCEDURE — 99213 OFFICE O/P EST LOW 20 MIN: CPT | Mod: ,,, | Performed by: NURSE PRACTITIONER

## 2023-10-13 PROCEDURE — 3008F BODY MASS INDEX DOCD: CPT | Mod: CPTII,,, | Performed by: NURSE PRACTITIONER

## 2023-10-13 RX ORDER — FLUCONAZOLE 150 MG/1
150 TABLET ORAL DAILY
Qty: 1 TABLET | Refills: 0 | Status: SHIPPED | OUTPATIENT
Start: 2023-10-13 | End: 2023-10-14

## 2023-10-13 RX ORDER — DEXAMETHASONE SODIUM PHOSPHATE 4 MG/ML
INJECTION, SOLUTION INTRA-ARTICULAR; INTRALESIONAL; INTRAMUSCULAR; INTRAVENOUS; SOFT TISSUE
Status: COMPLETED
Start: 2023-10-13 | End: 2023-10-13

## 2023-10-13 RX ORDER — DOXYCYCLINE 100 MG/1
100 CAPSULE ORAL 2 TIMES DAILY
Qty: 14 CAPSULE | Refills: 0 | Status: SHIPPED | OUTPATIENT
Start: 2023-10-13 | End: 2023-10-20

## 2023-10-13 RX ORDER — LEVOCETIRIZINE DIHYDROCHLORIDE 5 MG/1
5 TABLET, FILM COATED ORAL NIGHTLY
Qty: 30 TABLET | Refills: 11 | Status: SHIPPED | OUTPATIENT
Start: 2023-10-13 | End: 2024-10-12

## 2023-10-13 RX ADMIN — DEXAMETHASONE SODIUM PHOSPHATE 4 MG: 4 INJECTION, SOLUTION INTRA-ARTICULAR; INTRALESIONAL; INTRAMUSCULAR; INTRAVENOUS; SOFT TISSUE at 10:10

## 2023-10-13 NOTE — PATIENT INSTRUCTIONS
Sabino Bui,     If you are due for any health screening(s) below please notify me so we can arrange them to be ordered and scheduled. Most healthy patients at your age complete them, but you are free to accept or refuse.     If you can't do it, I'll definitely understand. If you can, I'd certainly appreciate it!    Tests to Keep You Healthy    Mammogram: ORDERED BUT NOT SCHEDULED  Colon Cancer Screening: DUE  Last Blood Pressure <= 139/89 (10/13/2023): NO      Schedule your breast cancer screening today     Breast cancer is the second most common cancer in women,  and the second leading cause of death from cancer. Mammograms can detect breast cancer early, which significantly increases the chances of curing the cancer.       Our records indicate that you may be overdue for breast cancer screening. Cancer screenings save lives, so schedule yours today to stay healthy.     If you recently had a mammogram performed outside of [facility], please let your Health care team know so that they can update your health record.        Its time for your colon cancer screening     Colorectal cancer is one of the leading causes of cancer death for men and women but it doesnt have to be. Screenings can prevent colorectal cancer or find it early enough to treat and cure the disease.     Our records indicate that you may be overdue for colon cancer screening. A colonoscopy or stool screening test can help identify patients at risk for developing colon cancer. Cancer screenings save lives, so schedule yours today to stay healthy.     A colonoscopy is the preferred test for detecting colon cancer. It is needed only once every 10 years if results are negative. While you are sedated, a flexible, lighted tube with a tiny camera is inserted into the rectum and advanced through the colon to look for cancers.     An alternative screening test that is used at home and returned to the lab may also be used. It detects hidden blood in  bowel movements which could indicate cancer in the colon. If results are positive, you will need a colonoscopy to determine if the blood is a sign of cancer. This type of follow up (diagnostic) colonoscopy usually requires additional copays as required by your insurance provider.     If you recently had your colon cancer screening performed outside of [facility], please let your Health care team know so that they can update your health record. Please contact your PCP if you have any questions.

## 2023-10-16 NOTE — PROGRESS NOTES
Patient ID: [id number]     Chief Complaint: Follow-up (C/o continued productive cough with greenish mucus that increases when laying down, wheezing, chest/stomach pain with cough, and head pressure.)      HPI:     Ramya Terrell is a 48 y.o. female here today for a follow up.  Patient reports continued productive cough with green mucus.  Patient also reports chest discomfort when coughing along with frontal sinus pressure.  Patient was seen in the ED on 10/09 and diagnosed with a viral upper respiratory infection.  Patient was prescribed a nebulizer and prednisone without improvement of symptoms.  Patient denies fever, myalgia, or shortness a breath.      Past Medical History:  has a past medical history of Anxiety disorder, unspecified, Depression, and Essential (primary) hypertension.    Surgical History:  has a past surgical history that includes Hysterectomy.    Family History: family history is not on file.    Social History:  reports that she has never smoked. She has never used smokeless tobacco. She reports current alcohol use of about 12.0 standard drinks of alcohol per week. She reports that she does not use drugs.    Current Outpatient Medications   Medication Instructions    albuterol (PROVENTIL/VENTOLIN HFA) 90 mcg/actuation inhaler INHALE TWO PUFFS BY MOUTH EVERY 6 HOURS    albuterol sulfate 2.5 mg, Nebulization, Every 4 hours PRN, Rescue    benazepriL (LOTENSIN) 40 MG tablet TAKE ONE TABLET BY MOUTH EVERY DAY .    busPIRone (BUSPAR) 5 MG Tab TAKE ONE TABLET BY MOUTH TWICE A DAY    doxycycline (VIBRAMYCIN) 100 mg, Oral, 2 times daily    levocetirizine (XYZAL) 5 mg, Oral, Nightly    nebulizer and compressor Yennifer 1 each, Misc.(Non-Drug; Combo Route), Every 12 hours    predniSONE (DELTASONE) 10 mg, Oral, Daily, Take 4 tabs x 3 days, then take 2 tabs x 3 days, then take 1 tab x 3 days.    rosuvastatin (CRESTOR) 10 mg, Oral, Nightly       Patient is allergic to penicillins.     Patient Care  "Team:  Elham Richter FNP as PCP - General (Family Medicine)       Subjective:     Review of Systems    12 point review of systems conducted, negative except as stated in the history of present illness. See HPI for details.      Objective:     Visit Vitals  /88 (BP Location: Left arm, Patient Position: Sitting)   Pulse 88   Temp 98.1 °F (36.7 °C) (Oral)   Resp 18   Ht 5' 4" (1.626 m)   Wt 98.3 kg (216 lb 11.2 oz)   LMP  (LMP Unknown)   SpO2 97%   BMI 37.20 kg/m²       Physical Exam  General: Alert and oriented, No acute distress.  Head: Normocephalic, Atraumatic.  Eye: Pupils are equal, round and reactive to light, Extraocular movements are intact, Sclera non-icteric.  Ears/Nose/Throat:  Bilateral ear effusions, posterior pharyngeal redness.  Neck/Thyroid: Supple, Non-tender, No carotid bruit, No lymphadenopathy, No JVD, Full range of motion.  Respiratory: Wheezing to anterior lung fields bilaterally; No rales or rhonchi, Non-labored respirations, Symmetrical chest wall expansion.  Cardiovascular: Regular rate and rhythm, S1/S2 normal, No murmurs, rubs or gallops.  Gastrointestinal: Soft, Non-tender, Non-distended, Normal bowel sounds, No palpable organomegaly.  Musculoskeletal: Normal range of motion.  Integumentary: Warm, Dry, Intact, No suspicious lesions or rashes.  Extremities: No clubbing, cyanosis or edema  Neurologic: No focal deficits, Cranial Nerves II-XII are grossly intact, Motor strength normal upper and lower extremities, Sensory exam intact.  Psychiatric: Normal interaction, Coherent speech, Euthymic mood, Appropriate affect     Labs Reviewed:     Chemistry:  Lab Results   Component Value Date     09/02/2023    K 4.0 09/02/2023    CHLORIDE 103 09/02/2023    BUN 5.9 (L) 09/02/2023    CREATININE 0.68 09/02/2023    EGFRNORACEVR >60 09/02/2023    GLUCOSE 101 (H) 09/02/2023    CALCIUM 9.5 09/02/2023    ALKPHOS 94 09/02/2023    LABPROT 7.5 09/02/2023    ALBUMIN 3.5 09/02/2023    BILIDIR " 0.2 05/30/2021    IBILI 0.40 05/30/2021    AST 15 09/02/2023    ALT 16 09/02/2023    CATNFPWV81OK 42.2 09/02/2023    TSH 1.904 09/02/2023        Lab Results   Component Value Date    HGBA1C 5.6 09/02/2023        Hematology:  Lab Results   Component Value Date    WBC 6.43 09/02/2023    HGB 12.6 09/02/2023    HCT 39.9 09/02/2023     09/02/2023       Lipid Panel:  Lab Results   Component Value Date    CHOL 273 (H) 09/02/2023    HDL 48 09/02/2023    .00 (H) 09/02/2023    TRIG 171 (H) 09/02/2023    TOTALCHOLEST 6 (H) 09/02/2023        Urine:  Lab Results   Component Value Date    APPEARANCEUA Slightly Cloudy 06/16/2020    PROTEINUA Negative 06/16/2020    LEUKOCYTESUR Negative 06/16/2020    RBCUA 0-2 06/16/2020    WBCUA 0-2 06/16/2020    BACTERIA None Seen 06/16/2020          Assessment:       ICD-10-CM ICD-9-CM   1. Acute frontal sinusitis, recurrence not specified  J01.10 461.1   2. Bronchitis  J40 490        Plan:   1. Acute frontal sinusitis, recurrence not specified  Doxycycline 100 mg p.o. b.i.d. x7 days sent to pharmacy.  Decadron 4 mg IM x1 given in office.  Patient tolerated well.  Levocetirizine 5 mg p.o. at bedtime sent to pharmacy.    2. Bronchitis  Decadron 4 mg IM x1 given in office.  Patient tolerated well.     Follow up if symptoms worsen or fail to improve. In addition to their scheduled follow up, the patient has also been instructed to follow up on as needed basis.     Future Appointments   Date Time Provider Department Center   12/12/2023  9:15 [facility]    7/24/2024  1:00 PM Lily Kaufman MD [facility]    9/9/2024  9:30 AM Elham Richter FNP [facility]        ELLEN Strickland

## 2023-11-21 DIAGNOSIS — R06.2 WHEEZING: Primary | ICD-10-CM

## 2023-11-21 RX ORDER — ALBUTEROL SULFATE 90 UG/1
2 AEROSOL, METERED RESPIRATORY (INHALATION)
Qty: 8.5 G | Refills: 11 | Status: SHIPPED | OUTPATIENT
Start: 2023-11-21

## 2023-11-27 DIAGNOSIS — E78.2 MIXED HYPERLIPIDEMIA: Primary | ICD-10-CM

## 2023-12-30 DIAGNOSIS — I10 HYPERTENSION, UNSPECIFIED TYPE: ICD-10-CM

## 2024-01-02 RX ORDER — BENAZEPRIL HYDROCHLORIDE 40 MG/1
TABLET ORAL
Qty: 30 TABLET | Refills: 3 | Status: SHIPPED | OUTPATIENT
Start: 2024-01-02

## 2024-04-12 ENCOUNTER — PATIENT MESSAGE (OUTPATIENT)
Dept: ADMINISTRATIVE | Facility: HOSPITAL | Age: 49
End: 2024-04-12
Payer: MEDICAID

## 2024-04-14 DIAGNOSIS — Z12.11 SCREENING FOR COLON CANCER: ICD-10-CM

## 2024-06-19 DIAGNOSIS — I10 HYPERTENSION, UNSPECIFIED TYPE: ICD-10-CM

## 2024-06-19 RX ORDER — BENAZEPRIL HYDROCHLORIDE 40 MG/1
TABLET ORAL
Qty: 30 TABLET | Refills: 3 | Status: SHIPPED | OUTPATIENT
Start: 2024-06-19

## 2024-06-21 DIAGNOSIS — E78.2 MIXED HYPERLIPIDEMIA: Primary | ICD-10-CM

## 2024-07-08 ENCOUNTER — TELEPHONE (OUTPATIENT)
Dept: FAMILY MEDICINE | Facility: CLINIC | Age: 49
End: 2024-07-08
Payer: MEDICAID

## 2024-07-08 NOTE — TELEPHONE ENCOUNTER
----- Message from Jaja Petit sent at 7/5/2024  2:55 PM CDT -----  Type:  RX Refill Request    Who Called: pt  Refill or New Rx:refill  RX Name and Strength:albuterol-ipratropium 2.5 mg-0.5 mg/3 mL nebulizer solution 3 mL  How is the patient currently taking it? (ex. 1XDay):  Is this a 30 day or 90 day RX:  Preferred Pharmacy with phone number:[id number] AT [address]  Local or Mail Order:  Ordering Provider:  Would the patient rather a call back or a response via [facility]?   Best Call Back Number:[phone]  Additional Information:

## 2024-07-11 ENCOUNTER — TELEPHONE (OUTPATIENT)
Dept: FAMILY MEDICINE | Facility: CLINIC | Age: 49
End: 2024-07-11
Payer: MEDICAID

## 2024-07-11 NOTE — TELEPHONE ENCOUNTER
Pt aware and advised she can also report to UC, after pt c/o not being able to be seen this day. Understanding voiced. .----- Message from ELLEN Strickland sent at 7/11/2024  3:39 PM CDT -----  Regarding: RE: call back  Needs appt.  ----- Message -----  From: Nyla Willett LPN  Sent: 7/11/2024   2:18 PM CDT  To: ELLEN Strickland  Subject: FW: call back                                    Pt believes she has bronchitis that has been present for a couple of weeks and is experiencing a constant cough that is worse at HS. She has been using her grand kid's nebulizer and meds to do breathing treatments. She feels she is not getting any better. She also reports difficulty breathing at HS when lying down please advise.  ----- Message -----  From: Monika Jarquin  Sent: 7/11/2024   1:59 PM CDT  To: [facility] Staff  Subject: call back                                        .Who Called: Ramya Terrell    Caller is requesting assistance/information from provider's office.    Symptoms (please be specific): constant cough   How long has patient had these symptoms:    List of preferred pharmacies on file (remove unneeded): [unfilled]  If different, enter pharmacy into here including location and phone number:       Preferred Method of Contact: Phone Call  Patient's Preferred Phone Number on File: [phone]   Best Call Back Number, if different:  Additional Information: pt requesting a call back

## 2024-07-12 ENCOUNTER — OFFICE VISIT (OUTPATIENT)
Dept: FAMILY MEDICINE | Facility: CLINIC | Age: 49
End: 2024-07-12
Payer: MEDICAID

## 2024-07-12 VITALS
RESPIRATION RATE: 18 BRPM | HEIGHT: 64 IN | DIASTOLIC BLOOD PRESSURE: 87 MMHG | SYSTOLIC BLOOD PRESSURE: 125 MMHG | TEMPERATURE: 98 F | OXYGEN SATURATION: 99 % | WEIGHT: 227.5 LBS | HEART RATE: 82 BPM | BODY MASS INDEX: 38.84 KG/M2

## 2024-07-12 DIAGNOSIS — Z12.31 BREAST CANCER SCREENING BY MAMMOGRAM: ICD-10-CM

## 2024-07-12 DIAGNOSIS — J01.10 ACUTE NON-RECURRENT FRONTAL SINUSITIS: Primary | ICD-10-CM

## 2024-07-12 PROCEDURE — 99214 OFFICE O/P EST MOD 30 MIN: CPT | Mod: ,,, | Performed by: NURSE PRACTITIONER

## 2024-07-12 PROCEDURE — 3074F SYST BP LT 130 MM HG: CPT | Mod: CPTII,,, | Performed by: NURSE PRACTITIONER

## 2024-07-12 PROCEDURE — 1159F MED LIST DOCD IN RCRD: CPT | Mod: CPTII,,, | Performed by: NURSE PRACTITIONER

## 2024-07-12 PROCEDURE — 4010F ACE/ARB THERAPY RXD/TAKEN: CPT | Mod: CPTII,,, | Performed by: NURSE PRACTITIONER

## 2024-07-12 PROCEDURE — 3079F DIAST BP 80-89 MM HG: CPT | Mod: CPTII,,, | Performed by: NURSE PRACTITIONER

## 2024-07-12 PROCEDURE — 3008F BODY MASS INDEX DOCD: CPT | Mod: CPTII,,, | Performed by: NURSE PRACTITIONER

## 2024-07-12 PROCEDURE — 1160F RVW MEDS BY RX/DR IN RCRD: CPT | Mod: CPTII,,, | Performed by: NURSE PRACTITIONER

## 2024-07-12 RX ORDER — ALBUTEROL SULFATE 0.83 MG/ML
2.5 SOLUTION RESPIRATORY (INHALATION) EVERY 4 HOURS PRN
COMMUNITY

## 2024-07-12 NOTE — PATIENT INSTRUCTIONS
Sabino Bui,     If you are due for any health screening(s) below please notify me so we can arrange them to be ordered and scheduled. Most healthy patients at your age complete them, but you are free to accept or refuse.     If you can't do it, I'll definitely understand. If you can, I'd certainly appreciate it!    Tests to Keep You Healthy    Mammogram: SCHEDULED  Colon Cancer Screening: ORDERED  Last Blood Pressure <= 139/89 (7/12/2024): Yes      Schedule your breast cancer screening today     Breast cancer is the second most common cancer in women,  and the second leading cause of death from cancer. Mammograms can detect breast cancer early, which significantly increases the chances of curing the cancer.       Our records indicate that you may be overdue for breast cancer screening. Cancer screenings save lives, so schedule yours today to stay healthy.     If you recently had a mammogram performed outside of [facility], please let your Health care team know so that they can update your health record.        Its time for your colon cancer screening     Colorectal cancer is one of the leading causes of cancer death for men and women but it doesnt have to be. Screenings can prevent colorectal cancer or find it early enough to treat and cure the disease.     Our records indicate that you may be overdue for colon cancer screening. A colonoscopy or stool screening test can help identify patients at risk for developing colon cancer. Cancer screenings save lives, so schedule yours today to stay healthy.     A colonoscopy is the preferred test for detecting colon cancer. It is needed only once every 10 years if results are negative. While you are sedated, a flexible, lighted tube with a tiny camera is inserted into the rectum and advanced through the colon to look for cancers.     An alternative screening test that is used at home and returned to the lab may also be used. It detects hidden blood in bowel  movements which could indicate cancer in the colon. If results are positive, you will need a colonoscopy to determine if the blood is a sign of cancer. This type of follow up (diagnostic) colonoscopy usually requires additional copays as required by your insurance provider.     If you recently had your colon cancer screening performed outside of [facility], please let your Health care team know so that they can update your health record. Please contact your PCP if you have any questions.

## 2024-07-16 RX ORDER — METHYLPREDNISOLONE 4 MG/1
TABLET ORAL
Qty: 21 EACH | Refills: 0 | Status: SHIPPED | OUTPATIENT
Start: 2024-07-16 | End: 2024-08-02

## 2024-07-16 RX ORDER — CHLOPHEDIANOL HCL AND PYRILAMINE MALEATE 12.5; 12.5 MG/5ML; MG/5ML
5 SOLUTION ORAL EVERY 8 HOURS PRN
Qty: 224 ML | Refills: 0 | Status: SHIPPED | OUTPATIENT
Start: 2024-07-16

## 2024-07-16 RX ORDER — CETIRIZINE HYDROCHLORIDE 10 MG/1
10 TABLET ORAL NIGHTLY
Qty: 30 TABLET | Refills: 3 | Status: SHIPPED | OUTPATIENT
Start: 2024-07-16 | End: 2025-07-16

## 2024-08-19 NOTE — PROGRESS NOTES
"   Patient ID: [id number]     Chief Complaint: Cough      HPI:     Ramya Terrell is a 49 y.o. female here today with complaints of cough, sinus pressure, headache, and ear pain x2 days.  Patient denies any chest pain, shortness a breath, fever or myalgia.    Past Medical History:  has a past medical history of Anxiety disorder, unspecified, Depression, and Essential (primary) hypertension.    Surgical History:  has a past surgical history that includes Hysterectomy.    Family History: family history is not on file.    Social History:  reports that she has never smoked. She has never used smokeless tobacco. She reports current alcohol use of about 12.0 standard drinks of alcohol per week. She reports that she does not use drugs.    Current Outpatient Medications   Medication Instructions    albuterol (PROVENTIL) 2.5 mg, Nebulization, Every 4 hours PRN, Rescue    albuterol (PROVENTIL/VENTOLIN HFA) 90 mcg/actuation inhaler 2 puffs, Inhalation    benazepriL (LOTENSIN) 40 MG tablet TAKE ONE TABLET BY MOUTH EVERY DAY .    busPIRone (BUSPAR) 5 MG Tab TAKE ONE TABLET BY MOUTH TWICE A DAY    cetirizine (ZYRTEC) 10 mg, Oral, Nightly    nebulizer and compressor Yennifer 1 each, Misc.(Non-Drug; Combo Route), Every 12 hours    pyrilamine-chlophedianoL (NINJACOF) 12.5-12.5 mg/5 mL Liqd 5 mLs, Oral, Every 8 hours PRN    rosuvastatin (CRESTOR) 10 mg, Oral, Nightly       Patient is allergic to penicillins.     Patient Care Team:  Elham Richter FNP as PCP - General (Family Medicine)       Subjective:     Review of Systems    12 point review of systems conducted, negative except as stated in the history of present illness. See HPI for details.      Objective:     Visit Vitals  /87 (BP Location: Left arm, Patient Position: Sitting)   Pulse 82   Temp 98.1 °F (36.7 °C) (Oral)   Resp 18   Ht 5' 4" (1.626 m)   Wt 103.2 kg (227 lb 8 oz)   LMP  (LMP Unknown)   SpO2 99%   BMI 39.05 kg/m²       Physical Exam    General: Alert and " oriented, No acute distress.  Head: Normocephalic, Atraumatic.  Eye: Pupils are equal, round and reactive to light, Extraocular movements are intact, Sclera non-icteric.  Ears/Nose/Throat:  Frontal and maxillary sinus tenderness, bilateral ear effusions with redness, posterior pharyngeal redness.    Neck/Thyroid: Supple, Non-tender, No carotid bruit, No lymphadenopathy, No JVD, Full range of motion.  Respiratory: Clear to auscultation bilaterally; No wheezes, rales or rhonchi,Non-labored respirations, Symmetrical chest wall expansion.  Cardiovascular: Regular rate and rhythm, S1/S2 normal, No murmurs, rubs or gallops.  Gastrointestinal: Soft, Non-tender, Non-distended, Normal bowel sounds, No palpable organomegaly.  Musculoskeletal: Normal range of motion.  Integumentary: Warm, Dry, Intact, No suspicious lesions or rashes.  Extremities: No clubbing, cyanosis or edema  Neurologic: No focal deficits, Cranial Nerves II-XII are grossly intact, Motor strength normal upper and lower extremities, Sensory exam intact.  Psychiatric: Normal interaction, Coherent speech, Euthymic mood, Appropriate affect     Labs Reviewed:     Chemistry:  Lab Results   Component Value Date     09/02/2023    K 4.0 09/02/2023    BUN 5.9 (L) 09/02/2023    CREATININE 0.68 09/02/2023    EGFRNORACEVR >60 09/02/2023    GLUCOSE 101 (H) 09/02/2023    CALCIUM 9.5 09/02/2023    ALKPHOS 94 09/02/2023    LABPROT 7.5 09/02/2023    ALBUMIN 3.5 09/02/2023    BILIDIR 0.2 05/30/2021    IBILI 0.40 05/30/2021    AST 15 09/02/2023    ALT 16 09/02/2023    GGSAAHNO10VI 42.2 09/02/2023    TSH 1.904 09/02/2023        Lab Results   Component Value Date    HGBA1C 5.6 09/02/2023        Hematology:  Lab Results   Component Value Date    WBC 6.43 09/02/2023    HGB 12.6 09/02/2023    HCT 39.9 09/02/2023     09/02/2023       Lipid Panel:  Lab Results   Component Value Date    CHOL 273 (H) 09/02/2023    HDL 48 09/02/2023    .00 (H) 09/02/2023    TRIG 171  (H) 09/02/2023    TOTALCHOLEST 6 (H) 09/02/2023        Urine:  Lab Results   Component Value Date    APPEARANCEUA Slightly Cloudy 06/16/2020    PROTEINUA Negative 06/16/2020    LEUKOCYTESUR Negative 06/16/2020    RBCUA 0-2 06/16/2020    WBCUA 0-2 06/16/2020    BACTERIA None Seen 06/16/2020          Assessment:       ICD-10-CM ICD-9-CM   1. Acute non-recurrent frontal sinusitis  J01.10 461.1   2. Breast cancer screening by mammogram  Z12.31 V76.12        Plan:   1. Acute non-recurrent frontal sinusitis  Start  Use OTC cold meds for symptoms (HTN and DM pt to avoid Sudafed or pseudoephedrine products).  Use OTC Tylenol or Advil for fever or body aches.  Get plenty of rest, eat a healthy diet, avoid alcohol and smoking.  Sore Throat: Use OTC lozenges or throat sprays, gargle with warm salt and water, warm tea with honey.  Nasal Congestion: Use OTC Mucinex D, humidifier or steamy shower.  Cough: Use Dextromethorphan/Doxylamine Cough Syrup at night.  Report fever greater than 101 F, breathing problems, painful or worsening cough, or changes in mucous (green, yellow, bloody).  Cover your mouth with coughing, avoid sharing cups or lip balm, wash your hand before eating or touching your face.    - cetirizine (ZYRTEC) 10 MG tablet; Take 1 tablet (10 mg total) by mouth every evening.  Dispense: 30 tablet; Refill: 3  - methylPREDNISolone (MEDROL DOSEPACK) 4 mg tablet; use as directed  Dispense: 21 each; Refill: 0  - pyrilamine-chlophedianoL (NINJACOF) 12.5-12.5 mg/5 mL Liqd; Take 5 mLs by mouth every 8 (eight) hours as needed (Cough).  Dispense: 224 mL; Refill: 0    2. Breast cancer screening by mammogram  - Mammo Digital Screening Bilat w/ [facility]; Future         Follow up if symptoms worsen or fail to improve. In addition to their scheduled follow up, the patient has also been instructed to follow up on as needed basis.     Future Appointments   Date Time Provider Department Center   9/9/2024  9:30 AM Elham Richter, [facility]  [facility] [place]   11/21/2024  8:00 AM Lily Kaufman MD [facility]

## 2024-09-12 ENCOUNTER — HOSPITAL ENCOUNTER (EMERGENCY)
Facility: HOSPITAL | Age: 49
Discharge: HOME | End: 2024-09-12
Attending: EMERGENCY MEDICINE
Payer: MEDICAID

## 2024-09-12 VITALS
BODY MASS INDEX: 36.65 KG/M2 | HEIGHT: 65 IN | TEMPERATURE: 98 F | WEIGHT: 220 LBS | RESPIRATION RATE: 18 BRPM | SYSTOLIC BLOOD PRESSURE: 166 MMHG | HEART RATE: 86 BPM | DIASTOLIC BLOOD PRESSURE: 125 MMHG | OXYGEN SATURATION: 98 %

## 2024-09-12 DIAGNOSIS — I10 HYPERTENSION, UNSPECIFIED TYPE: ICD-10-CM

## 2024-09-12 DIAGNOSIS — R10.10 PAIN OF UPPER ABDOMEN: Primary | ICD-10-CM

## 2024-09-12 DIAGNOSIS — R10.10 UPPER ABDOMINAL PAIN: ICD-10-CM

## 2024-09-12 LAB
ALBUMIN SERPL-MCNC: 3.8 G/DL (ref 3.5–5)
ALBUMIN/GLOB SERPL: 0.9 RATIO (ref 1.1–2)
ALP SERPL-CCNC: 122 UNIT/L (ref 40–150)
ALT SERPL-CCNC: 15 UNIT/L (ref 0–55)
ANION GAP SERPL CALC-SCNC: 9 MEQ/L
AST SERPL-CCNC: 15 UNIT/L (ref 5–34)
BACTERIA #/AREA URNS AUTO: ABNORMAL /HPF
BASOPHILS # BLD AUTO: 0.02 X10(3)/MCL
BASOPHILS NFR BLD AUTO: 0.4 %
BILIRUB SERPL-MCNC: 0.4 MG/DL
BILIRUB UR QL STRIP.AUTO: NEGATIVE
BUN SERPL-MCNC: 6.1 MG/DL (ref 7–18.7)
CALCIUM SERPL-MCNC: 9.6 MG/DL (ref 8.4–10.2)
CHLORIDE SERPL-SCNC: 104 MMOL/L (ref 98–107)
CLARITY UR: ABNORMAL
CO2 SERPL-SCNC: 28 MMOL/L (ref 22–29)
COLOR UR AUTO: ABNORMAL
CREAT SERPL-MCNC: 0.73 MG/DL (ref 0.55–1.02)
CREAT/UREA NIT SERPL: 8
EOSINOPHIL # BLD AUTO: 0.14 X10(3)/MCL (ref 0–0.9)
EOSINOPHIL NFR BLD AUTO: 2.5 %
ERYTHROCYTE [DISTWIDTH] IN BLOOD BY AUTOMATED COUNT: 12.7 % (ref 11.5–17)
GFR SERPLBLD CREATININE-BSD FMLA CKD-EPI: >60 ML/MIN/1.73/M2
GLOBULIN SER-MCNC: 4.3 GM/DL (ref 2.4–3.5)
GLUCOSE SERPL-MCNC: 97 MG/DL (ref 74–100)
GLUCOSE UR QL STRIP: NEGATIVE
HCT VFR BLD AUTO: 41.9 % (ref 37–47)
HGB BLD-MCNC: 13.3 G/DL (ref 12–16)
HGB UR QL STRIP: ABNORMAL
IMM GRANULOCYTES # BLD AUTO: 0.01 X10(3)/MCL (ref 0–0.04)
IMM GRANULOCYTES NFR BLD AUTO: 0.2 %
KETONES UR QL STRIP: NEGATIVE
LEUKOCYTE ESTERASE UR QL STRIP: NEGATIVE
LIPASE SERPL-CCNC: 23 U/L
LYMPHOCYTES # BLD AUTO: 1.9 X10(3)/MCL (ref 0.6–4.6)
LYMPHOCYTES NFR BLD AUTO: 34.2 %
MCH RBC QN AUTO: 27.6 PG (ref 27–31)
MCHC RBC AUTO-ENTMCNC: 31.7 G/DL (ref 33–36)
MCV RBC AUTO: 86.9 FL (ref 80–94)
MONOCYTES # BLD AUTO: 0.28 X10(3)/MCL (ref 0.1–1.3)
MONOCYTES NFR BLD AUTO: 5 %
NEUTROPHILS # BLD AUTO: 3.21 X10(3)/MCL (ref 2.1–9.2)
NEUTROPHILS NFR BLD AUTO: 57.7 %
NITRITE UR QL STRIP: NEGATIVE
PH UR STRIP: 6 [PH]
PLATELET # BLD AUTO: 282 X10(3)/MCL (ref 130–400)
PMV BLD AUTO: 9.6 FL (ref 7.4–10.4)
POTASSIUM SERPL-SCNC: 3.7 MMOL/L (ref 3.5–5.1)
PROT SERPL-MCNC: 8.1 GM/DL (ref 6.4–8.3)
PROT UR QL STRIP: NEGATIVE
RBC # BLD AUTO: 4.82 X10(6)/MCL (ref 4.2–5.4)
RBC #/AREA URNS AUTO: ABNORMAL /HPF
SODIUM SERPL-SCNC: 141 MMOL/L (ref 136–145)
SP GR UR STRIP.AUTO: >=1.03 (ref 1–1.03)
SQUAMOUS #/AREA URNS AUTO: ABNORMAL /HPF
TROPONIN I SERPL-MCNC: <0.01 NG/ML (ref 0–0.04)
UROBILINOGEN UR STRIP-ACNC: 0.2
WBC # BLD AUTO: 5.56 X10(3)/MCL (ref 4.5–11.5)
WBC #/AREA URNS AUTO: ABNORMAL /HPF

## 2024-09-12 PROCEDURE — 81003 URINALYSIS AUTO W/O SCOPE: CPT | Performed by: NURSE PRACTITIONER

## 2024-09-12 PROCEDURE — 99285 EMERGENCY DEPT VISIT HI MDM: CPT | Mod: 25

## 2024-09-12 PROCEDURE — 84484 ASSAY OF TROPONIN QUANT: CPT | Performed by: NURSE PRACTITIONER

## 2024-09-12 PROCEDURE — 25000003 PHARM REV CODE 250: Performed by: NURSE PRACTITIONER

## 2024-09-12 PROCEDURE — 83690 ASSAY OF LIPASE: CPT | Performed by: NURSE PRACTITIONER

## 2024-09-12 PROCEDURE — 93005 ELECTROCARDIOGRAM TRACING: CPT

## 2024-09-12 PROCEDURE — 85025 COMPLETE CBC W/AUTO DIFF WBC: CPT | Performed by: NURSE PRACTITIONER

## 2024-09-12 PROCEDURE — 80053 COMPREHEN METABOLIC PANEL: CPT | Performed by: NURSE PRACTITIONER

## 2024-09-12 PROCEDURE — 93010 ELECTROCARDIOGRAM REPORT: CPT | Mod: ,,, | Performed by: INTERNAL MEDICINE

## 2024-09-12 RX ORDER — DICYCLOMINE HYDROCHLORIDE 20 MG/1
20 TABLET ORAL EVERY 6 HOURS PRN
Qty: 20 TABLET | Refills: 0 | Status: SHIPPED | OUTPATIENT
Start: 2024-09-12 | End: 2024-09-17

## 2024-09-12 RX ORDER — DICYCLOMINE HYDROCHLORIDE 10 MG/1
20 CAPSULE ORAL
Status: COMPLETED | OUTPATIENT
Start: 2024-09-12 | End: 2024-09-12

## 2024-09-12 RX ORDER — CLONIDINE HYDROCHLORIDE 0.2 MG/1
0.2 TABLET ORAL
Status: COMPLETED | OUTPATIENT
Start: 2024-09-12 | End: 2024-09-12

## 2024-09-12 RX ADMIN — DICYCLOMINE HYDROCHLORIDE 20 MG: 10 CAPSULE ORAL at 08:09

## 2024-09-12 RX ADMIN — CLONIDINE HYDROCHLORIDE 0.2 MG: 0.2 TABLET ORAL at 08:09

## 2024-09-12 NOTE — Clinical Note
"Ramya Fischerrebekah Terrell was seen and treated in our emergency department on 9/12/2024.  She may return to work on 09/14/2024.       If you have any questions or concerns, please don't hesitate to call.      Glenis Spears FNP"

## 2024-09-13 NOTE — DISCHARGE INSTRUCTIONS
Dicyclomine 4 times a day as needed for abdominal cramping  [place] diet progressing as tolerated  Take your blood pressure medications as prescribed

## 2024-09-13 NOTE — ED PROVIDER NOTES
Encounter Date: 9/12/2024       History     Chief Complaint   Patient presents with    Abdominal Pain     PT with complaints of lower abdominal pain that started last night. She states it comes and goes about twice an hour. Complaints of mild nausea but not vomiting or diarrhea.  She thought she may have been constipated but had a normal  bm today and the pain is still there.      49-year-old female presents to ER complaining of mid epigastric abdominal pain since last night with nausea.  She denies any vomiting or diarrhea.  She denies any dysuria.  No chest pain, shortness of breath or back pain.  Patient reports pain is dull and intermittent.  She states she has been having some spasmodic episodes about twice an hour.  She does report having a normal bowel movement today.      The history is provided by the patient. No  was used.     Review of patient's allergies indicates:   Allergen Reactions    Penicillin      Other Reaction(s): Unknown    Penicillins      Past Medical History:   Diagnosis Date    Anxiety disorder, unspecified     Depression     Essential (primary) hypertension      Past Surgical History:   Procedure Laterality Date    HYSTERECTOMY       No family history on file.  Social History     Tobacco Use    Smoking status: Never    Smokeless tobacco: Never   Substance Use Topics    Alcohol use: Yes     Alcohol/week: 12.0 standard drinks of alcohol     Types: 12 Cans of beer per week    Drug use: Never     Review of Systems   Constitutional:  Negative for fever.   Respiratory:  Negative for shortness of breath.    Cardiovascular:  Negative for chest pain.   Gastrointestinal:  Positive for abdominal pain and nausea. Negative for diarrhea and vomiting.   Genitourinary:  Negative for dysuria.   Musculoskeletal:  Negative for back pain.   All other systems reviewed and are negative.      Physical Exam     Initial Vitals [09/12/24 1939]   BP Pulse Resp Temp SpO2   (!) 165/111 96 18 98 °F  (36.7 °C) 99 %      MAP       --         Physical Exam    Nursing note and vitals reviewed.  Constitutional: She appears well-developed and well-nourished.   HENT:   Head: Normocephalic.   Eyes: EOM are normal.   Neck: Neck supple.   Cardiovascular:  Normal rate and regular rhythm.           Pulmonary/Chest: Breath sounds normal. No respiratory distress.   Abdominal: Abdomen is soft. Bowel sounds are normal. There is abdominal tenderness in the left upper quadrant and left lower quadrant. There is no rebound and no guarding.   Musculoskeletal:      Cervical back: Neck supple.           ED Course   Procedures  Labs Reviewed   COMPREHENSIVE METABOLIC PANEL - Abnormal       Result Value    Sodium 141      Potassium 3.7      Chloride 104      CO2 28      Glucose 97      Blood Urea Nitrogen 6.1 (*)     Creatinine 0.73      Calcium 9.6      Protein Total 8.1      Albumin 3.8      Globulin 4.3 (*)     Albumin/Globulin Ratio 0.9 (*)     Bilirubin Total 0.4            ALT 15      AST 15      eGFR >60      Anion Gap 9.0      BUN/Creatinine Ratio 8     URINALYSIS, REFLEX TO URINE CULTURE - Abnormal    Color, UA Straw      Appearance, UA SL CLOUDY (*)     Specific Gravity, UA >=1.030      pH, UA 6.0      Protein, UA Negative      Glucose, UA Negative      Ketones, UA Negative      Blood, UA Small (*)     Bilirubin, UA Negative      Urobilinogen, UA 0.2      Nitrites, UA Negative      Leukocyte Esterase, UA Negative     CBC WITH DIFFERENTIAL - Abnormal    WBC 5.56      RBC 4.82      Hgb 13.3      Hct 41.9      MCV 86.9      MCH 27.6      MCHC 31.7 (*)     RDW 12.7      Platelet 282      MPV 9.6      Neut % 57.7      Lymph % 34.2      Mono % 5.0      Eos % 2.5      Basophil % 0.4      Lymph # 1.90      Neut # 3.21      Mono # 0.28      Eos # 0.14      Baso # 0.02      IG# 0.01      IG% 0.2     URINALYSIS, MICROSCOPIC - Abnormal    Bacteria, UA Few (*)     RBC, UA 0-5      WBC, UA 0-2      Squamous Epithelial Cells, UA Many  (*)    LIPASE - Normal    Lipase Level 23     TROPONIN I - Normal    Troponin-I <0.010     CBC W/ AUTO DIFFERENTIAL    Narrative:     The following orders were created for panel order CBC auto differential.  Procedure                               Abnormality         Status                     ---------                               -----------         ------                     CBC with Differential[[id number]]       Abnormal            Final result                 Please view results for these tests on the individual orders.     EKG Readings: (Independently Interpreted)   Initial Reading: No STEMI. Rhythm: Normal Sinus Rhythm. Heart Rate: 88. Ectopy: No Ectopy. ST Segments: Non-Specific ST Segment Depression. Clinical Impression: Normal Sinus Rhythm       Imaging Results              XR ABDOMEN  ACUTE 2 OR MORE VIEWS WITH CHEST (Preliminary result)  Result time 09/12/24 20:46:05      Wet Read by Glenis Spears FNP (09/12/24 20:46:05, [facility] Emergency Dept, Emergency Medicine)    Nonobstructive bowel gas pattern.  No cardiopulmonary processes                                     Medications   dicyclomine capsule 20 mg (has no administration in time range)   cloNIDine tablet 0.2 mg (0.2 mg Oral Given 9/12/24 2011)     Medical Decision Making  DDX: MI, bowel obstruction, cholecystitis, constipation, PUD, GERD, HTN    49-year-old female complaining of midepigastric abdominal pain with nausea that started last night.  No leukocytosis.  Normal H&H.  Urinalysis with small amount of blood but no nitrites or leukocytes.  Few bacteria.  Sodium potassium creatinine and liver enzymes within normal limits.  Lipase within normal limits.  Troponin less than 0.01.  EKG sinus rhythm without ectopy.  X-ray of the abdomen and pelvis with one-view chest without acute cardiopulmonary process, nonobstructive bowel gas pattern.  Patient be discharged home with Bentyl and instructed to follow up with her primary care.   Patient encouraged to take her blood pressure medication as prescribe      Amount and/or Complexity of Data Reviewed  Labs: ordered. Decision-making details documented in ED Course.  Radiology: ordered and independent interpretation performed. Decision-making details documented in ED Course.  ECG/medicine tests: ordered and independent interpretation performed. Decision-making details documented in ED Course.    Risk  Prescription drug management.               ED Course as of 09/12/24 2050   Thu Sep 12, 2024   2012 WBC: 5.56 [LN]   2012 Hemoglobin: 13.3 [LN]   2012 Hematocrit: 41.9 [LN]   2012 Blood, UA(!): Small [LN]   2012 NITRITE UA: Negative [LN]   2012 Leukocyte Esterase, UA: Negative [LN]   2013 Bacteria, UA(!): Few [LN]   2013 WBC, UA: 0-2 [LN]   2022 Sodium: 141 [LN]   2024 Potassium: 3.7 [LN]   2024 CO2: 28 [LN]   2024 BUN(!): 6.1 [LN]   2024 Creatinine: 0.73 [LN]   2024 ALP: 122 [LN]   2024 ALT: 15 [LN]   2024 AST: 15 [LN]   2024 eGFR: >60 [LN]   2029 Troponin I: <0.010 [LN]   2029 Lipase: 23 [LN]      ED Course User Index  [LN] Glenis Spears FNP                             Clinical Impression:  Final diagnoses:  [R10.10] Upper abdominal pain  [R10.10] Pain of upper abdomen (Primary)  [I10] Hypertension, unspecified type          ED Disposition Condition    Discharge Stable          ED Prescriptions       Medication Sig Dispense Start Date End Date Auth. Provider    dicyclomine (BENTYL) 20 mg tablet Take 1 tablet (20 mg total) by mouth every 6 (six) hours as needed (Abdominal pain). 20 tablet 9/12/2024 9/17/2024 Glenis Spears FNP          Follow-up Information    None          Glenis Spears FNP  09/12/24 2052

## 2024-09-16 LAB
OHS QRS DURATION: 84 MS
OHS QTC CALCULATION: 435 MS

## 2024-09-28 ENCOUNTER — HOSPITAL ENCOUNTER (EMERGENCY)
Facility: HOSPITAL | Age: 49
Discharge: HOME | End: 2024-09-28
Attending: SPECIALIST
Payer: MEDICAID

## 2024-09-28 VITALS
HEIGHT: 65 IN | WEIGHT: 225 LBS | BODY MASS INDEX: 37.49 KG/M2 | OXYGEN SATURATION: 97 % | TEMPERATURE: 98 F | SYSTOLIC BLOOD PRESSURE: 161 MMHG | HEART RATE: 101 BPM | RESPIRATION RATE: 18 BRPM | DIASTOLIC BLOOD PRESSURE: 116 MMHG

## 2024-09-28 DIAGNOSIS — M54.2 NECK PAIN: Primary | ICD-10-CM

## 2024-09-28 PROCEDURE — 99284 EMERGENCY DEPT VISIT MOD MDM: CPT | Mod: 25

## 2024-09-28 PROCEDURE — 63600175 PHARM REV CODE 636 W HCPCS: Performed by: SPECIALIST

## 2024-09-28 PROCEDURE — 96372 THER/PROPH/DIAG INJ SC/IM: CPT | Performed by: SPECIALIST

## 2024-09-28 RX ORDER — KETOROLAC TROMETHAMINE 30 MG/ML
60 INJECTION, SOLUTION INTRAMUSCULAR; INTRAVENOUS
Status: COMPLETED | OUTPATIENT
Start: 2024-09-28 | End: 2024-09-28

## 2024-09-28 RX ADMIN — KETOROLAC TROMETHAMINE 60 MG: 30 INJECTION, SOLUTION INTRAMUSCULAR at 05:09

## 2024-09-28 NOTE — ED PROVIDER NOTES
Encounter Date: 9/28/2024       History     Chief Complaint   Patient presents with    Neck Pain     Posterior neck pain and back pain x1-2 days. States she may have slept wrong.      49-year-old to the left side of her neck radiating down along the trapezius and scapular area, no injury noted, present for a couple of days    The history is provided by the patient.     Review of patient's allergies indicates:   Allergen Reactions    Penicillin      Other Reaction(s): Unknown    Penicillins      Past Medical History:   Diagnosis Date    Anxiety disorder, unspecified     Depression     Essential (primary) hypertension      Past Surgical History:   Procedure Laterality Date    HYSTERECTOMY       No family history on file.  Social History     Tobacco Use    Smoking status: Never    Smokeless tobacco: Never   Substance Use Topics    Alcohol use: Yes     Alcohol/week: 12.0 standard drinks of alcohol     Types: 12 Cans of beer per week    Drug use: Never     Review of Systems   Constitutional: Negative.    HENT: Negative.     Respiratory: Negative.     Cardiovascular: Negative.    Gastrointestinal: Negative.    Musculoskeletal:  Positive for arthralgias.   Skin: Negative.    Neurological: Negative.    Psychiatric/Behavioral:  Positive for dysphoric mood.    All other systems reviewed and are negative.      Physical Exam     Initial Vitals [09/28/24 0510]   BP Pulse Resp Temp SpO2   (!) 161/116 101 18 98.1 °F (36.7 °C) 97 %      MAP       --         Physical Exam    Nursing note and vitals reviewed.  Constitutional: She appears well-developed and well-nourished.   HENT:   Head: Normocephalic and atraumatic.   Eyes: EOM are normal. Pupils are equal, round, and reactive to light.   Neck: Neck supple.   Left paraspinous tenderness, mild, reduced range of motion lateral movements to the left   Normal range of motion.  Cardiovascular:  Normal rate, regular rhythm, normal heart sounds and intact distal pulses.            Pulmonary/Chest: Breath sounds normal.   Abdominal: Abdomen is soft. There is no abdominal tenderness.   Musculoskeletal:         General: Normal range of motion.      Cervical back: Normal range of motion and neck supple.     Neurological: She is alert and oriented to person, place, and time. She has normal strength. GCS score is 15. GCS eye subscore is 4. GCS verbal subscore is 5. GCS motor subscore is 6.   Skin: Skin is warm and dry.         ED Course   Procedures  Labs Reviewed - No data to display       Imaging Results              X-Ray Cervical Spine AP And Lateral (Preliminary result)  Result time 09/28/24 05:46:33      Wet Read by Davy Munoz MD (09/28/24 05:46:33, [facility] - Emergency Dept, Emergency Medicine)    No acute osseous abnormality                                     Medications   ketorolac injection 60 mg (60 mg Intramuscular Given 9/28/24 0523)     Medical Decision Making  49-year-old to the left side of her neck radiating down along the trapezius and scapular area, no injury noted, present for a couple of days    DIFFERENTIAL DIAGNOSIS- cervical strain, cervical degenerative disc disease, DJD, scapular strain    Care transitioned to Dr. Contreras at 6:00 a.m.    Amount and/or Complexity of Data Reviewed  Radiology: ordered and independent interpretation performed. Decision-making details documented in ED Course.    Risk  Prescription drug management.  Risk Details: Patient given Toradol 60 mg IM;              Attending Attestation:             Attending ED Notes:   Assumed care at shift change. Pain better. Bp elevated but hasn't taken morning meds yet.  Will f/u w pcp regarding bp                             Clinical Impression:  Final diagnoses:  [M54.2] Neck pain (Primary)          ED Disposition Condition    Discharge Stable          ED Prescriptions    None       Follow-up Information    None          Butch Contreras MD  09/28/24 0603

## 2024-10-02 ENCOUNTER — OFFICE VISIT (OUTPATIENT)
Dept: FAMILY MEDICINE | Facility: CLINIC | Age: 49
End: 2024-10-02
Payer: MEDICAID

## 2024-10-02 VITALS
HEART RATE: 100 BPM | OXYGEN SATURATION: 96 % | BODY MASS INDEX: 37.49 KG/M2 | TEMPERATURE: 98 F | WEIGHT: 225 LBS | SYSTOLIC BLOOD PRESSURE: 148 MMHG | HEIGHT: 65 IN | DIASTOLIC BLOOD PRESSURE: 108 MMHG

## 2024-10-02 DIAGNOSIS — I10 ESSENTIAL (PRIMARY) HYPERTENSION: Primary | ICD-10-CM
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
## 2024-10-02 DIAGNOSIS — Z76.0 MEDICATION REFILL: ICD-10-CM

## 2024-10-02 DIAGNOSIS — S40.261A INSECT BITE OF RIGHT SHOULDER, INITIAL ENCOUNTER: ICD-10-CM

## 2024-10-02 DIAGNOSIS — L03.90 CELLULITIS, UNSPECIFIED CELLULITIS SITE: ICD-10-CM

## 2024-10-02 RX ORDER — VORTIOXETINE 20 MG/1
1 TABLET, FILM COATED ORAL DAILY
COMMUNITY

## 2024-10-02 RX ORDER — ROSUVASTATIN CALCIUM 10 MG/1
10 TABLET, COATED ORAL NIGHTLY
Qty: 90 TABLET | Refills: 3 | Status: SHIPPED | OUTPATIENT
Start: 2024-10-02 | End: 2025-10-02

## 2024-10-02 RX ORDER — SULFAMETHOXAZOLE AND TRIMETHOPRIM 800; 160 MG/1; MG/1
1 TABLET ORAL 2 TIMES DAILY
Qty: 14 TABLET | Refills: 0 | Status: SHIPPED | OUTPATIENT
Start: 2024-10-02 | End: 2024-10-09

## 2024-10-02 RX ORDER — AMITRIPTYLINE HYDROCHLORIDE 50 MG/1
50 TABLET, FILM COATED ORAL NIGHTLY
COMMUNITY

## 2024-10-02 RX ORDER — CHOLECALCIFEROL (VITAMIN D3) 25 MCG
1000 TABLET ORAL DAILY
COMMUNITY

## 2024-10-02 RX ORDER — FREMANEZUMAB-VFRM 225 MG/1.5ML
225 INJECTION SUBCUTANEOUS
COMMUNITY

## 2024-10-02 RX ORDER — ALBUTEROL SULFATE 0.83 MG/ML
2.5 SOLUTION RESPIRATORY (INHALATION) EVERY 4 HOURS PRN
Qty: 3 ML | Refills: 11 | Status: SHIPPED | OUTPATIENT
Start: 2024-10-02

## 2024-10-02 RX ORDER — AMLODIPINE BESYLATE 5 MG/1
5 TABLET ORAL DAILY
Qty: 30 TABLET | Refills: 3 | Status: SHIPPED | OUTPATIENT
Start: 2024-10-02 | End: 2025-10-02

## 2024-10-04 NOTE — PROGRESS NOTES
Patient ID: [id number]     Chief Complaint: er outpatient f/u  (9/12/24 pain upper abdomen pain - is off and on /09/28/24 pain neck and shoulder going down her back - they gave her a shot getting better , done and xray - [facility] )      HPI:     Ramya Terrell is a 49 y.o. female here today for a follow up.  Patient presents with complaints of redness and swelling to right posterior shoulder.  Patient unsure if she was bitten by an insect.  Patient reports symptoms began yesterday.      Health Maintenance   Topic Date Due    Hepatitis C Screening  Never done    TETANUS VACCINE  05/28/2001    Colorectal Cancer Screening  Never done    Mammogram  07/23/2021    Lipid Panel  09/02/2028       Past Medical History:  has a past medical history of Anxiety disorder, unspecified, Depression, and Essential (primary) hypertension.    Surgical History:  has a past surgical history that includes Hysterectomy.    Family History: family history is not on file.    Social History:  reports that she has never smoked. She has never used smokeless tobacco. She reports current alcohol use of about 12.0 standard drinks of alcohol per week. She reports that she does not use drugs.    Current Outpatient Medications   Medication Instructions    AJOVY AUTOINJECTOR 225 mg, Every 30 days    albuterol (PROVENTIL) 2.5 mg, Nebulization, Every 4 hours PRN, Rescue    albuterol (PROVENTIL/VENTOLIN HFA) 90 mcg/actuation inhaler 2 puffs, Inhalation    amitriptyline (ELAVIL) 50 mg, Nightly    amLODIPine (NORVASC) 5 mg, Oral, Daily    benazepriL (LOTENSIN) 40 MG tablet TAKE ONE TABLET BY MOUTH EVERY DAY .    busPIRone (BUSPAR) 5 MG Tab TAKE ONE TABLET BY MOUTH TWICE A DAY    cetirizine (ZYRTEC) 10 mg, Oral, Nightly    nebulizer and compressor Yennifer 1 each, Misc.(Non-Drug; Combo Route), Every 12 hours    rosuvastatin (CRESTOR) 10 mg, Oral, Nightly    sulfamethoxazole-trimethoprim 800-160mg (BACTRIM DS) 800-160 mg Tab 1 tablet, Oral, 2 times daily     "TRINTELLIX 20 mg Tab 1 tablet, Daily    vitamin D (VITAMIN D3) 1,000 Units, Daily       Patient is allergic to penicillin and penicillins.     Patient Care Team:  Elham Richter FNP as PCP - General (Family Medicine)       Subjective:     Review of Systems    12 point review of systems conducted, negative except as stated in the history of present illness. See HPI for details.      Objective:     Visit Vitals  BP (!) 148/108   Pulse (P) 100   Temp 98.4 °F (36.9 °C)   Ht 5' 5" (1.651 m)   Wt 102.1 kg (225 lb)   LMP  (LMP Unknown)   SpO2 96%   BMI 37.44 kg/m²       Physical Exam    General: Alert and oriented, No acute distress.  Head: Normocephalic, Atraumatic.  Eye: Pupils are equal, round and reactive to light, Extraocular movements are intact, Sclera non-icteric.  Ears/Nose/Throat: Normal, Mucosa moist,Clear.  Neck/Thyroid: Supple, Non-tender, No carotid bruit, No lymphadenopathy, No JVD, Full range of motion.  Respiratory: Clear to auscultation bilaterally; No wheezes, rales or rhonchi,Non-labored respirations, Symmetrical chest wall expansion.  Cardiovascular: Regular rate and rhythm, S1/S2 normal, No murmurs, rubs or gallops.  Gastrointestinal: Soft, Non-tender, Non-distended, Normal bowel sounds, No palpable organomegaly.  Musculoskeletal: Normal range of motion.  Integumentary: Red raised area to right posterior shoulder with erythema.  No drainage noted.  Extremities: No clubbing, cyanosis or edema  Neurologic: No focal deficits, Cranial Nerves II-XII are grossly intact, Motor strength normal upper and lower extremities, Sensory exam intact.  Psychiatric: Normal interaction, Coherent speech, Euthymic mood, Appropriate affect     Labs Reviewed:     Chemistry:  Lab Results   Component Value Date     09/12/2024    K 3.7 09/12/2024    BUN 6.1 (L) 09/12/2024    CREATININE 0.73 09/12/2024    EGFRNORACEVR >60 09/12/2024    GLUCOSE 97 09/12/2024    CALCIUM 9.6 09/12/2024    ALKPHOS 122 09/12/2024    " LABPROT 8.1 09/12/2024    ALBUMIN 3.8 09/12/2024    BILIDIR 0.2 05/30/2021    IBILI 0.40 05/30/2021    AST 15 09/12/2024    ALT 15 09/12/2024    ORBHDEBG07CC 42.2 09/02/2023    TSH 1.904 09/02/2023        Lab Results   Component Value Date    HGBA1C 5.6 09/02/2023        Hematology:  Lab Results   Component Value Date    WBC 5.56 09/12/2024    HGB 13.3 09/12/2024    HCT 41.9 09/12/2024     09/12/2024       Lipid Panel:  Lab Results   Component Value Date    CHOL 273 (H) 09/02/2023    HDL 48 09/02/2023    .00 (H) 09/02/2023    TRIG 171 (H) 09/02/2023    TOTALCHOLEST 6 (H) 09/02/2023        Urine:  Lab Results   Component Value Date    APPEARANCEUA SL CLOUDY (A) 09/12/2024    SGUA >=1.030 09/12/2024    PROTEINUA Negative 09/12/2024    KETONESUA Negative 09/12/2024    LEUKOCYTESUR Negative 09/12/2024    RBCUA 0-5 09/12/2024    WBCUA 0-2 09/12/2024    BACTERIA Few (A) 09/12/2024          Assessment:       ICD-10-CM ICD-9-CM   1. Essential (primary) hypertension  I10 401.9   2. Cellulitis, unspecified cellulitis site  L03.90 682.9   3. Insect bite of right shoulder, initial encounter  S40.261A 912.4    W57.XXXA E906.4   4. Medication refill  Z76.0 V68.1        Plan:   1. Essential (primary) hypertension (Primary)  Not at goal.  Amlodipine 5 mg p.o. daily added to current regimen.  Monitor blood pressure daily.  Return to clinic in 2 weeks for blood pressure recheck.  Hypertension Medications               amLODIPine (NORVASC) 5 MG tablet Take 1 tablet (5 mg total) by mouth once daily.    benazepriL (LOTENSIN) 40 MG tablet TAKE ONE TABLET BY MOUTH EVERY DAY .        Low Sodium Diet (DASH Diet - Less than 2 grams of sodium per day).  Monitor blood pressure daily and log. Report consistent numbers greater than 140/90.  Maintain healthy weight with goal BMI <30. Exercise 30 minutes per day, 5 days per week.  Smoking cessation encouraged to aid in BP reduction.      2. Cellulitis, unspecified cellulitis  site  Take all antibiotics as prescribed.  Return to clinic if symptoms do not improve.  ED precautions.  - sulfamethoxazole-trimethoprim 800-160mg (BACTRIM DS) 800-160 mg Tab; Take 1 tablet by mouth 2 (two) times daily. for 7 days  Dispense: 14 tablet; Refill: 0    3. Insect bite of right shoulder, initial encounter  - sulfamethoxazole-trimethoprim 800-160mg (BACTRIM DS) 800-160 mg Tab; Take 1 tablet by mouth 2 (two) times daily. for 7 days  Dispense: 14 tablet; Refill: 0    4. Medication refill  - rosuvastatin (CRESTOR) 10 MG tablet; Take 1 tablet (10 mg total) by mouth every evening.  Dispense: 90 tablet; Refill: 3      Follow up in about 2 weeks (around 10/16/2024) for BP Check, Nurse Visit. In addition to their scheduled follow up, the patient has also been instructed to follow up on as needed basis.     Future Appointments   Date Time Provider Department Center   10/16/2024  9:00 AM NURSE, [facility] FAMILY MEDICINE [facility]   11/21/2024  8:00 AM Lily Kaufman MD [facility]    12/4/2024  1:00 PM Elham Richter FNP [facility]        ELLEN Strickland

## 2024-10-15 ENCOUNTER — LAB VISIT (OUTPATIENT)
Dept: LAB | Facility: HOSPITAL | Age: 49
End: 2024-10-15
Attending: NURSE PRACTITIONER
Payer: MEDICAID

## 2024-10-15 DIAGNOSIS — Z00.00 WELL ADULT EXAM: ICD-10-CM

## 2024-10-15 LAB
25(OH)D3+25(OH)D2 SERPL-MCNC: 27 NG/ML (ref 30–80)
BASOPHILS # BLD AUTO: 0.02 X10(3)/MCL
BASOPHILS NFR BLD AUTO: 0.3 %
CHOLEST SERPL-MCNC: 165 MG/DL
CHOLEST/HDLC SERPL: 3 {RATIO} (ref 0–5)
EOSINOPHIL # BLD AUTO: 0.18 X10(3)/MCL (ref 0–0.9)
EOSINOPHIL NFR BLD AUTO: 2.8 %
ERYTHROCYTE [DISTWIDTH] IN BLOOD BY AUTOMATED COUNT: 12.8 % (ref 11.5–17)
EST. AVERAGE GLUCOSE BLD GHB EST-MCNC: 125.5 MG/DL
HBA1C MFR BLD: 6 %
HCT VFR BLD AUTO: 40.3 % (ref 37–47)
HDLC SERPL-MCNC: 48 MG/DL (ref 35–60)
HGB BLD-MCNC: 12.9 G/DL (ref 12–16)
IMM GRANULOCYTES # BLD AUTO: 0.01 X10(3)/MCL (ref 0–0.04)
IMM GRANULOCYTES NFR BLD AUTO: 0.2 %
LDLC SERPL CALC-MCNC: 94 MG/DL (ref 50–140)
LYMPHOCYTES # BLD AUTO: 3.33 X10(3)/MCL (ref 0.6–4.6)
LYMPHOCYTES NFR BLD AUTO: 51.6 %
MCH RBC QN AUTO: 28 PG (ref 27–31)
MCHC RBC AUTO-ENTMCNC: 32 G/DL (ref 33–36)
MCV RBC AUTO: 87.6 FL (ref 80–94)
MONOCYTES # BLD AUTO: 0.45 X10(3)/MCL (ref 0.1–1.3)
MONOCYTES NFR BLD AUTO: 7 %
NEUTROPHILS # BLD AUTO: 2.46 X10(3)/MCL (ref 2.1–9.2)
NEUTROPHILS NFR BLD AUTO: 38.1 %
PLATELET # BLD AUTO: 292 X10(3)/MCL (ref 130–400)
PMV BLD AUTO: 9.5 FL (ref 7.4–10.4)
RBC # BLD AUTO: 4.6 X10(6)/MCL (ref 4.2–5.4)
TRIGL SERPL-MCNC: 114 MG/DL (ref 37–140)
TSH SERPL-ACNC: 2.03 UIU/ML (ref 0.35–4.94)
VLDLC SERPL CALC-MCNC: 23 MG/DL
WBC # BLD AUTO: 6.45 X10(3)/MCL (ref 4.5–11.5)

## 2024-10-15 PROCEDURE — 80061 LIPID PANEL: CPT

## 2024-10-15 PROCEDURE — 85025 COMPLETE CBC W/AUTO DIFF WBC: CPT

## 2024-10-15 PROCEDURE — 82306 VITAMIN D 25 HYDROXY: CPT

## 2024-10-15 PROCEDURE — 36415 COLL VENOUS BLD VENIPUNCTURE: CPT

## 2024-10-15 PROCEDURE — 84443 ASSAY THYROID STIM HORMONE: CPT

## 2024-10-15 PROCEDURE — 83036 HEMOGLOBIN GLYCOSYLATED A1C: CPT

## 2024-11-17 NOTE — PROGRESS NOTES
"  Patient ID: [id number]     Chief Complaint: abnormal labs (Patient here for abnormal labs. She states she has generalized inflammation to her joints. )      Referred By: No ref. provider found     HPI:     Ramya Terrell is a 49 y.o. female here today for a new patient visit.  Past medical history of chronic migraine s/p Botox injection, anxiety, hypertension, asthma.  Patient referred here due to positive CHRISTINE. Used to follow rheumatology in [place] Dr. Salo Conrad, first seen 07/24/2023, she reported that she was told she had some "inflammation on her body" and she stated her previous rheumatologist does not specialize on what she has, she was apparently told to see another rheumatologist.    Around 2016, patient was admitted in ICU after her "head popped" and later noticed she had a strip of hair missing, she was thought to have aneurysm but didn't have any based on imaging, she reportedly had "brain infection" (meningitis?), her blood pressure was elevated. Brain MRI, MRA and MRV during that time was normal. Since then, she reports memory loss and headaches associated with worsening intermittent joint pains (bilateral knees, hips, back, ankles, upper and lower back). Morning stiffness 30-40 minutes. She reported sometimes she has weak knees, and her ankles would give out. She also reports if she bends down to put on her shoes she gets dizzy. Takes ibuprofen and acetaminophen without relief. Reports snoring, poor sleep without medication, wakes up tired.     Denies history of fevers, rashes, photosensitivity, oral or nasal ulcers, h/o MI, stroke, seizures, h/o PE or DVT, Raynaud's phenomenon, uveitis, malignancies.     Family history of autoimmune disease: None    Pregnancies:  4 Miscarriages: 0    Smoking:   Social History     Tobacco Use   Smoking Status Never   Smokeless Tobacco Never   Smokes very rarely only "a puff" for unknown years.  Drinks wine 1-2 glasses a month.   Denies drug " use.       -------------------------------------    Anxiety disorder, unspecified    Depression    Essential (primary) hypertension        Past Surgical History:   Procedure Laterality Date    HYSTERECTOMY         Review of patient's allergies indicates:   Allergen Reactions    Rodeo     Penicillin      Other Reaction(s): Unknown    Penicillins        Outpatient Medications Marked as Taking for the 11/21/24 encounter (Office Visit) with Lily Kaufman MD   Medication Sig Dispense Refill    albuterol (PROVENTIL) 2.5 mg /3 mL (0.083 %) nebulizer solution Take 3 mLs (2.5 mg total) by nebulization every 4 (four) hours as needed (rescue). Rescue 3 mL 11    albuterol (PROVENTIL/VENTOLIN HFA) 90 mcg/actuation inhaler INHALE 2 PUFFS BY MOUTH EVERY 6 HOURS 8.5 g 11    amitriptyline (ELAVIL) 50 MG tablet Take 50 mg by mouth every evening.      amLODIPine (NORVASC) 5 MG tablet Take 1 tablet (5 mg total) by mouth once daily. 30 tablet 3    benazepriL (LOTENSIN) 40 MG tablet TAKE ONE TABLET BY MOUTH EVERY DAY . 30 tablet 3    busPIRone (BUSPAR) 5 MG Tab TAKE ONE TABLET BY MOUTH TWICE A DAY 60 tablet 6    cetirizine (ZYRTEC) 10 MG tablet Take 1 tablet (10 mg total) by mouth every evening. 30 tablet 3    fremanezumab-vfrm (AJOVY AUTOINJECTOR) 225 mg/1.5 mL autoinjector Inject 225 mg into the skin every 30 days.      nebulizer and compressor Yennifer 1 each by Misc.(Non-Drug; Combo Route) route every 12 (twelve) hours. 1 each 3    rosuvastatin (CRESTOR) 10 MG tablet Take 1 tablet (10 mg total) by mouth every evening. 90 tablet 3    TRINTELLIX 20 mg Tab Take 1 tablet by mouth once daily.      vitamin D (VITAMIN D3) 1000 units Tab Take 1,000 Units by mouth once daily.         Social History     Socioeconomic History    Marital status:    Occupational History     Comment: unemployed   Tobacco Use    Smoking status: Never    Smokeless tobacco: Never   Substance and Sexual Activity    Alcohol use: Yes     Alcohol/week: 12.0  standard drinks of alcohol     Types: 12 Cans of beer per week     Comment: occ    Drug use: Never    Sexual activity: Yes     Partners: Male     Birth control/protection: None     Social Drivers of Health     Financial Resource Strain: Medium Risk (10/15/2024)    Overall Financial Resource Strain (CARDIA)     Difficulty of Paying Living Expenses: Somewhat hard   Food Insecurity: No Food Insecurity (10/15/2024)    Hunger Vital Sign     Worried About Running Out of Food in the Last Year: Never true     Ran Out of Food in the Last Year: Never true   Physical Activity: Inactive (10/15/2024)    Exercise Vital Sign     Days of Exercise per Week: 2 days     Minutes of Exercise per Session: 0 min   Stress: No Stress Concern Present (10/15/2024)    [employer] - Occupational Stress Questionnaire     Feeling of Stress : Only a little   Housing Stability: Unknown (10/15/2024)    Housing Stability Vital Sign     Unable to Pay for Housing in the Last Year: No        Family History   Problem Relation Name Age of Onset    Hypertension Mother          Immunization History   Administered Date(s) Administered    COVID-19, MRNA, LN-S, PF (Pfizer) (Purple Cap) 10/07/2021, 10/28/2021    DTP 1975, 1975, 1975, 11/16/1976, 01/29/1980    Hepatitis A / Hepatitis B 04/07/2004    Influenza - Quadrivalent - PF *Preferred* (6 months and older) 01/26/2016    Influenza A (H1N1) 2009 Monovalent - IM - PF 01/27/2010    MMR 11/12/1976    OPV 1975, 1975, 1975, 11/16/1976, 01/29/1980    Td (ADULT) 05/28/1991    Td - PF (ADULT) 05/28/1991       Patient Care Team:  Elham Richter FNP as PCP - General (Family Medicine)     Subjective:     Constitutional:  Denies chills. Denies fever. Denies night sweats. Denies weight loss.   Ophthalmology: Denies blurred vision. Denies dry eyes. Denies eye pain. Denies Itching and redness.   ENT: Denies oral ulcers. Denies epistaxis. Denies dry  "mouth. Denies swollen glands.   Endocrine: Denies diabetes. Denies thyroid Problems.   Respiratory: Denies cough. Denies shortness of breath. Denies shortness of breath with exertion. Denies hemoptysis.   Cardiovascular: Denies chest pain at rest. Denies chest pain with exertion. Denies palpitations.    Gastrointestinal: Denies abdominal pain. Denies diarrhea. Denies nausea. Denies vomiting. Denies hematemesis or hematochezia. Denies heartburn.  Genitourinary: Denies blood in urine.  Musculoskeletal: See HPI for details  Integumentary: Denies rash. Denies photosensitivity.   Peripheral Vascular: Denies Ulcers of hands and/or feet. Denies Cold extremities.   Neurologic: Denies dizziness. Denies headache.  Denies loss of strength. Denies numbness or tingling.   Psychiatric: Denies depression. Denies anxiety. Denies suicidal/homicidal ideations.      Objective:     /88 (BP Location: Left arm, Patient Position: Sitting)   Pulse 91   Temp 97.8 °F (36.6 °C) (Oral)   Resp 18   Ht 5' 5" (1.651 m)   Wt 103.3 kg (227 lb 12.8 oz)   LMP  (LMP Unknown)   SpO2 98%   BMI 37.91 kg/m²     Physical Exam    General Appearance: alert, pleasant, in no acute distress.  Skin: Skin color, texture, turgor normal. No rashes or lesions.  Eyes:  extraocular movement intact (EOMI), pupils equal, round, reactive to light and accommodation, conjunctiva clear.  ENT: No oral or nasal ulcers.  Neck:  Neck supple. No adenopathy.   Lungs: CTA throughout without crackles, rhonchi, or wheezes.   Heart: RRR w/o murmurs.  No edema. 2+ intact DP/TP pulse.  Abdomen: Soft, non-tender, no masses, rebound or guarding.  Neuro: Alert, oriented, CN II-XII GI, sensory and motor innervation intact.  Musculoskeletal: Generalized pain on palpation on bilateral PIPs and MCPs with worse pain in between joints; pain on bilateral knees, upper and lower back, left hip, bilateral knees left more than right; no active redness swelling of joints  Psych: Alert, " oriented, normal eye contact.    Labs:   10/15/2024:  Vitamin-D low 27, normal white count, normal platelet count, normal hemoglobin, A1c 6.0, TSH normal    09/12/2024:  Kidney function normal, normal liver enzymes, urinalysis with small blood, negative protein    07/24/2023:  Sjogren's Ab SSA negative, SSB negative, ds DNA antibody negative, Rodriguez antibody negative, SM/RNP antibody negative, RNP antibody 1.1 positive (normal is less than 1.0), Dara 1 antibody negative, SCL 70 negative, C3 high 200, C4 high 58, ESR high 28, CRP high 32.6, RF IgG IgA IgM all negative, CCP antibody less than 16 negative, 14.3.3 ETA protein negative less than 0.2      1/25/22: CHRISTINE 1:40, Nuclear and dense fine speckled pattern. RF negative.     Imaging:   10/6/2022: Sleep study: mild obstructive sleep apnea. Recommend a titration night study or an APAP trial.    09/28/2024 X-ray cervical spine: The vertebral body heights are preserved. No acute displaced fractures identified. There is no significant spondylolisthesis. Atlantoaxial articulation appears intact. Prevertebral soft tissues appear within normal limits.     Assessment:       ICD-10-CM ICD-9-CM   1. Positive CHRISTINE (antinuclear antibody)  R76.8 795.79   2. Fibromyalgia  M79.7 729.1   3. Essential (primary) hypertension  I10 401.9   4. Hyperlipidemia, unspecified hyperlipidemia type  E78.5 272.4   5. Anxiety  F41.9 300.00   6. Depression, unspecified depression type  F32.A 311   7. Chronic intractable headache, unspecified headache type  R51.9 784.0    G89.29         Plan:     1. Positive CHRISTINE (antinuclear antibody)    2. Fibromyalgia    3. Essential (primary) hypertension    4. Hyperlipidemia, unspecified hyperlipidemia type    5. Anxiety    6. Depression, unspecified depression type    7. Chronic intractable headache, unspecified headache type         Positive CHRISTINE  -Positive CHRISTINE (antinuclear antibody):  CHRISTINE positive 1:40, Nuclear and dense fine speckled pattern. Currently does not  have any signs or symptoms of lupus. Likely false positive CHRISTINE. I will check ROMEO's. I will call with results, if ROMEO's are abnormal I will see him/her back. If ROMEO's are negative, no need to follow up with me.      Fibromyalgia  -Discussed clinical features of fibromyalgia in detail.  Fibromyalgia is a chronic pain syndrome.  Underlying causes of fibromyalgia may be numerous.  An underlying nonrestorative sleep pattern, mental and physical stressors play a role in pain perception.  -Discussed relationship of pain with sleep.  The brain functions best with a normal restful sleep that includes REM sleep.  Discussed that a nonrestorative sleep pattern may cause a decrease in pain tolerance.  Discussed that over time, this builds up and causes a cycling effect on pain.  This pain is not easily curable with pain medications or narcotics.  It is important to decrease stress levels and improving sleep patterns/hygiene.  A restorative sleep pattern is important in improving the perception of pain.  -Medications for fibromyalgia only help 10-20% of the time and come with multiple side effects. FDA recommended medications for fibromyalgia include: lyrica, cymbalta, and savella.  Other medications which have been used in fibromyalgia include amitriptyline, gabapentin, flexeril, gabapentin, and low dose naltresone.   -Exercise and a healthy life-style is important in management of this syndrome. We discussed this at length and I have recommended regular low impact exercise, preferably aquatic therapy, as well as cognitive/ behavioral therapy.      Hypertension   -Dietary and lifestyle modifications  -Continue medications  -Management per PCP    Hyperlipidemia  -Continue rosuvastatin    Anxiety/depression   -Continue medications    Migraines  -Used to follow with neurology  -On Ajovy      No follow-ups on file. In addition to their scheduled follow up, the patient has also been instructed to follow up on as needed basis.         Total time spent with patient and documentation is 60 minutes. All questions were answered to patient's satisfaction and patient verbalized understanding.        Odalys Petersen MD  [employer] Internal Medicine PGY-III

## 2024-11-21 ENCOUNTER — LAB VISIT (OUTPATIENT)
Dept: LAB | Facility: HOSPITAL | Age: 49
End: 2024-11-21
Attending: INTERNAL MEDICINE
Payer: MEDICAID

## 2024-11-21 ENCOUNTER — OFFICE VISIT (OUTPATIENT)
Dept: RHEUMATOLOGY | Facility: CLINIC | Age: 49
End: 2024-11-21
Payer: MEDICAID

## 2024-11-21 VITALS
HEART RATE: 91 BPM | RESPIRATION RATE: 18 BRPM | BODY MASS INDEX: 37.95 KG/M2 | DIASTOLIC BLOOD PRESSURE: 88 MMHG | OXYGEN SATURATION: 98 % | TEMPERATURE: 98 F | HEIGHT: 65 IN | WEIGHT: 227.81 LBS | SYSTOLIC BLOOD PRESSURE: 132 MMHG

## 2024-11-21 DIAGNOSIS — R76.8 POSITIVE ANA (ANTINUCLEAR ANTIBODY): ICD-10-CM

## 2024-11-21 DIAGNOSIS — F32.A DEPRESSION, UNSPECIFIED DEPRESSION TYPE: ICD-10-CM

## 2024-11-21 DIAGNOSIS — M79.7 FIBROMYALGIA: ICD-10-CM

## 2024-11-21 DIAGNOSIS — G89.29 CHRONIC INTRACTABLE HEADACHE, UNSPECIFIED HEADACHE TYPE: ICD-10-CM

## 2024-11-21 DIAGNOSIS — F41.9 ANXIETY: ICD-10-CM

## 2024-11-21 DIAGNOSIS — R76.8 POSITIVE ANA (ANTINUCLEAR ANTIBODY): Primary | ICD-10-CM

## 2024-11-21 DIAGNOSIS — I10 ESSENTIAL (PRIMARY) HYPERTENSION: ICD-10-CM

## 2024-11-21 DIAGNOSIS — E78.5 HYPERLIPIDEMIA, UNSPECIFIED HYPERLIPIDEMIA TYPE: ICD-10-CM

## 2024-11-21 DIAGNOSIS — R51.9 CHRONIC INTRACTABLE HEADACHE, UNSPECIFIED HEADACHE TYPE: ICD-10-CM

## 2024-11-21 DIAGNOSIS — E78.2 MIXED HYPERLIPIDEMIA: ICD-10-CM

## 2024-11-21 LAB
ALBUMIN SERPL-MCNC: 3.9 G/DL (ref 3.5–5)
ALBUMIN/GLOB SERPL: 0.9 RATIO (ref 1.1–2)
ALP SERPL-CCNC: 126 UNIT/L (ref 40–150)
ALT SERPL-CCNC: 18 UNIT/L (ref 0–55)
ANION GAP SERPL CALC-SCNC: 8 MEQ/L
AST SERPL-CCNC: 17 UNIT/L (ref 5–34)
BILIRUB SERPL-MCNC: 0.5 MG/DL
BUN SERPL-MCNC: 7.3 MG/DL (ref 7–18.7)
CALCIUM SERPL-MCNC: 9.8 MG/DL (ref 8.4–10.2)
CHLORIDE SERPL-SCNC: 104 MMOL/L (ref 98–107)
CHOLEST SERPL-MCNC: 198 MG/DL
CHOLEST/HDLC SERPL: 4 {RATIO} (ref 0–5)
CO2 SERPL-SCNC: 29 MMOL/L (ref 22–29)
CREAT SERPL-MCNC: 0.71 MG/DL (ref 0.55–1.02)
CREAT/UREA NIT SERPL: 10
GFR SERPLBLD CREATININE-BSD FMLA CKD-EPI: >60 ML/MIN/1.73/M2
GLOBULIN SER-MCNC: 4.2 GM/DL (ref 2.4–3.5)
GLUCOSE SERPL-MCNC: 114 MG/DL (ref 74–100)
HDLC SERPL-MCNC: 55 MG/DL (ref 35–60)
LDLC SERPL CALC-MCNC: 120 MG/DL (ref 50–140)
POTASSIUM SERPL-SCNC: 3.9 MMOL/L (ref 3.5–5.1)
PROT SERPL-MCNC: 8.1 GM/DL (ref 6.4–8.3)
SODIUM SERPL-SCNC: 141 MMOL/L (ref 136–145)
TRIGL SERPL-MCNC: 115 MG/DL (ref 37–140)
VLDLC SERPL CALC-MCNC: 23 MG/DL

## 2024-11-21 PROCEDURE — 1159F MED LIST DOCD IN RCRD: CPT | Mod: CPTII,,, | Performed by: INTERNAL MEDICINE

## 2024-11-21 PROCEDURE — 80053 COMPREHEN METABOLIC PANEL: CPT

## 2024-11-21 PROCEDURE — 99205 OFFICE O/P NEW HI 60 MIN: CPT | Mod: S$PBB,,, | Performed by: INTERNAL MEDICINE

## 2024-11-21 PROCEDURE — 3008F BODY MASS INDEX DOCD: CPT | Mod: CPTII,,, | Performed by: INTERNAL MEDICINE

## 2024-11-21 PROCEDURE — 99215 OFFICE O/P EST HI 40 MIN: CPT | Mod: PBBFAC | Performed by: INTERNAL MEDICINE

## 2024-11-21 PROCEDURE — 80061 LIPID PANEL: CPT

## 2024-11-21 PROCEDURE — 3075F SYST BP GE 130 - 139MM HG: CPT | Mod: CPTII,,, | Performed by: INTERNAL MEDICINE

## 2024-11-21 PROCEDURE — 4010F ACE/ARB THERAPY RXD/TAKEN: CPT | Mod: CPTII,,, | Performed by: INTERNAL MEDICINE

## 2024-11-21 PROCEDURE — 3044F HG A1C LEVEL LT 7.0%: CPT | Mod: CPTII,,, | Performed by: INTERNAL MEDICINE

## 2024-11-21 PROCEDURE — 1160F RVW MEDS BY RX/DR IN RCRD: CPT | Mod: CPTII,,, | Performed by: INTERNAL MEDICINE

## 2024-11-21 PROCEDURE — 3079F DIAST BP 80-89 MM HG: CPT | Mod: CPTII,,, | Performed by: INTERNAL MEDICINE

## 2024-11-21 PROCEDURE — 36415 COLL VENOUS BLD VENIPUNCTURE: CPT

## 2024-11-26 ENCOUNTER — TELEPHONE (OUTPATIENT)
Dept: FAMILY MEDICINE | Facility: CLINIC | Age: 49
End: 2024-11-26
Payer: MEDICAID

## 2024-12-03 ENCOUNTER — TELEPHONE (OUTPATIENT)
Dept: RHEUMATOLOGY | Facility: CLINIC | Age: 49
End: 2024-12-03
Payer: MEDICAID

## 2024-12-03 NOTE — TELEPHONE ENCOUNTER
----- Message from Aidee sent at 11/27/2024 10:19 AM CST -----  Regarding: lab results  Pt called to get lab results. Pt can be reached at  [phone]

## 2024-12-04 ENCOUNTER — HOSPITAL ENCOUNTER (OUTPATIENT)
Dept: RADIOLOGY | Facility: HOSPITAL | Age: 49
Discharge: HOME | End: 2024-12-04
Attending: NURSE PRACTITIONER
Payer: MEDICAID

## 2024-12-04 ENCOUNTER — OFFICE VISIT (OUTPATIENT)
Dept: FAMILY MEDICINE | Facility: CLINIC | Age: 49
End: 2024-12-04
Payer: MEDICAID

## 2024-12-04 VITALS
RESPIRATION RATE: 18 BRPM | TEMPERATURE: 98 F | BODY MASS INDEX: 37.82 KG/M2 | DIASTOLIC BLOOD PRESSURE: 82 MMHG | HEIGHT: 65 IN | WEIGHT: 227 LBS | OXYGEN SATURATION: 99 % | SYSTOLIC BLOOD PRESSURE: 117 MMHG | HEART RATE: 99 BPM

## 2024-12-04 DIAGNOSIS — M54.50 CHRONIC BILATERAL LOW BACK PAIN WITHOUT SCIATICA: ICD-10-CM

## 2024-12-04 DIAGNOSIS — Z12.12 ENCOUNTER FOR COLORECTAL CANCER SCREENING: ICD-10-CM

## 2024-12-04 DIAGNOSIS — R73.03 PRE-DIABETES: ICD-10-CM

## 2024-12-04 DIAGNOSIS — Z00.00 WELLNESS EXAMINATION: Primary | ICD-10-CM

## 2024-12-04 DIAGNOSIS — M25.552 LEFT HIP PAIN: ICD-10-CM

## 2024-12-04 DIAGNOSIS — Z23 FLU VACCINE NEED: ICD-10-CM

## 2024-12-04 DIAGNOSIS — Z12.11 ENCOUNTER FOR COLORECTAL CANCER SCREENING: ICD-10-CM

## 2024-12-04 DIAGNOSIS — E66.9 OBESITY, UNSPECIFIED CLASS, UNSPECIFIED OBESITY TYPE, UNSPECIFIED WHETHER SERIOUS COMORBIDITY PRESENT: ICD-10-CM

## 2024-12-04 DIAGNOSIS — G89.29 CHRONIC BILATERAL LOW BACK PAIN WITHOUT SCIATICA: ICD-10-CM

## 2024-12-04 PROCEDURE — 4010F ACE/ARB THERAPY RXD/TAKEN: CPT | Mod: CPTII,,, | Performed by: NURSE PRACTITIONER

## 2024-12-04 PROCEDURE — 3008F BODY MASS INDEX DOCD: CPT | Mod: CPTII,,, | Performed by: NURSE PRACTITIONER

## 2024-12-04 PROCEDURE — 72110 X-RAY EXAM L-2 SPINE 4/>VWS: CPT | Mod: TC

## 2024-12-04 PROCEDURE — 90471 IMMUNIZATION ADMIN: CPT | Mod: ,,, | Performed by: NURSE PRACTITIONER

## 2024-12-04 PROCEDURE — 3044F HG A1C LEVEL LT 7.0%: CPT | Mod: CPTII,,, | Performed by: NURSE PRACTITIONER

## 2024-12-04 PROCEDURE — 1160F RVW MEDS BY RX/DR IN RCRD: CPT | Mod: CPTII,,, | Performed by: NURSE PRACTITIONER

## 2024-12-04 PROCEDURE — 3079F DIAST BP 80-89 MM HG: CPT | Mod: CPTII,,, | Performed by: NURSE PRACTITIONER

## 2024-12-04 PROCEDURE — 3074F SYST BP LT 130 MM HG: CPT | Mod: CPTII,,, | Performed by: NURSE PRACTITIONER

## 2024-12-04 PROCEDURE — 99396 PREV VISIT EST AGE 40-64: CPT | Mod: 25,,, | Performed by: NURSE PRACTITIONER

## 2024-12-04 PROCEDURE — 90656 IIV3 VACC NO PRSV 0.5 ML IM: CPT | Mod: ,,, | Performed by: NURSE PRACTITIONER

## 2024-12-04 PROCEDURE — 1159F MED LIST DOCD IN RCRD: CPT | Mod: CPTII,,, | Performed by: NURSE PRACTITIONER

## 2024-12-04 PROCEDURE — 73502 X-RAY EXAM HIP UNI 2-3 VIEWS: CPT | Mod: TC,LT

## 2024-12-04 RX ORDER — MUPIROCIN 20 MG/G
OINTMENT TOPICAL 3 TIMES DAILY
Qty: 22 G | Refills: 0 | Status: SHIPPED | OUTPATIENT
Start: 2024-12-04

## 2024-12-04 RX ORDER — MELOXICAM 15 MG/1
15 TABLET ORAL DAILY
Qty: 30 TABLET | Refills: 3 | Status: SHIPPED | OUTPATIENT
Start: 2024-12-04

## 2024-12-04 RX ORDER — METHOCARBAMOL 500 MG/1
500 TABLET, FILM COATED ORAL 4 TIMES DAILY
Qty: 40 TABLET | Refills: 0 | Status: SHIPPED | OUTPATIENT
Start: 2024-12-04 | End: 2024-12-14

## 2024-12-04 NOTE — PROGRESS NOTES
After signing consent, patient given low dose flu vaccine, 0.5 ml, IM in the right deltoid without difficulty.  Patient tolerated well without complaints.  Instructed to wait 15 minutes to monitor for any adverse side effects.  Voices understanding and agrees.

## 2024-12-04 NOTE — PROGRESS NOTES
Patient ID: [id number]     Chief Complaint: Annual Exam      HPI:     Ramya Terrell is a 49 y.o. female here today for an annual wellness visit.  She presents with complaints of chronic lower back pain.  Patient rates pain 9/10.  She denies saddle anesthesia or bowel or bladder incontinence.  She reports pain is worse when standing for long periods, relieved with rest.  She also presents with complaints of left hip pain.  Patient denies trauma or injury.  Patient reports pain begins in left hip and radiates to left lower extremity.  Patient reports pain is a shocking sensation.  Patient reports history of falls due to her left leg giving out.      Health Maintenance   Topic Date Due    Hepatitis C Screening  Never done    HIV Screening  Never done    TETANUS VACCINE  05/28/2001    Colorectal Cancer Screening  Never done    Mammogram  07/23/2021    COVID-19 Vaccine (3 - 2024-25 season) 09/01/2024    Hemoglobin A1c (Diabetic Prevention Screening)  10/15/2027    Lipid Panel  11/21/2029    RSV Vaccine (Age 60+ and Pregnant patients) (1 - 1-dose 75+ series) 04/29/2050    Influenza Vaccine  Completed    Pneumococcal Vaccines (Age 0-64)  Aged Out       Dental Exam - Recommend biannually  Vaccinations -   Immunization History   Administered Date(s) Administered    COVID-19, MRNA, LN-S, PF (Pfizer) (Purple Cap) 10/07/2021, 10/28/2021    DTP 1975, 1975, 1975, 11/16/1976, 01/29/1980    Hepatitis A / Hepatitis B 04/07/2004    Influenza - Quadrivalent - PF *Preferred* (6 months and older) 01/26/2016    Influenza - Trivalent - Fluarix, Flulaval, Fluzone, Afluria - PF 12/04/2024    Influenza A (H1N1) 2009 Monovalent - IM - PF 01/27/2010    MMR 11/12/1976    OPV 1975, 1975, 1975, 11/16/1976, 01/29/1980    Td (ADULT) 05/28/1991    Td - PF (ADULT) 05/28/1991        Past Medical History:  has a past medical history of Anxiety disorder, unspecified, Depression, and Essential (primary)  "hypertension.    Surgical History:  has a past surgical history that includes Hysterectomy.    Family History: family history includes Hypertension in her mother.    Social History:  reports that she has never smoked. She has never used smokeless tobacco. She reports current alcohol use of about 12.0 standard drinks of alcohol per week. She reports that she does not use drugs.    Current Outpatient Medications   Medication Instructions    AJOVY AUTOINJECTOR 225 mg, Every 30 days    albuterol (PROVENTIL) 2.5 mg, Nebulization, Every 4 hours PRN, Rescue    albuterol (PROVENTIL/VENTOLIN HFA) 90 mcg/actuation inhaler 2 puffs, Inhalation    amitriptyline (ELAVIL) 50 mg, Nightly    amLODIPine (NORVASC) 5 mg, Oral, Daily    benazepriL (LOTENSIN) 40 MG tablet TAKE ONE TABLET BY MOUTH EVERY DAY .    busPIRone (BUSPAR) 5 MG Tab TAKE ONE TABLET BY MOUTH TWICE A DAY    cetirizine (ZYRTEC) 10 mg, Oral, Nightly    meloxicam (MOBIC) 15 mg, Oral, Daily    methocarbamoL (ROBAXIN) 500 mg, Oral, 4 times daily    mupirocin (BACTROBAN) 2 % ointment Topical (Top), 3 times daily    nebulizer and compressor Yennifer 1 each, Misc.(Non-Drug; Combo Route), Every 12 hours    rosuvastatin (CRESTOR) 10 mg, Oral, Nightly    TRINTELLIX 20 mg Tab 1 tablet, Daily    vitamin D (VITAMIN D3) 1,000 Units, Daily       Patient is allergic to catie, penicillin, and penicillins.     Patient Care Team:  Elham Richter FNP as PCP - General (Family Medicine)       Subjective:     Review of Systems    12 point review of systems conducted, negative except as stated in the history of present illness. See HPI for details.      Objective:     Visit Vitals  /82 (BP Location: Left arm, Patient Position: Sitting)   Pulse 99   Temp 98.1 °F (36.7 °C) (Oral)   Resp 18   Ht 5' 5" (1.651 m)   Wt 103 kg (227 lb)   LMP  (LMP Unknown)   SpO2 99%   BMI 37.77 kg/m²       Physical Exam    General: Alert and oriented, No acute distress.  Head: Normocephalic, " Atraumatic.  Eye: Pupils are equal, round and reactive to light, Extraocular movements are intact, Sclera non-icteric.  Ears/Nose/Throat: Normal, Mucosa moist,Clear.  Neck/Thyroid: Supple, Non-tender, No carotid bruit, No lymphadenopathy, No JVD, Full range of motion.  Respiratory: Clear to auscultation bilaterally; No wheezes, rales or rhonchi,Non-labored respirations, Symmetrical chest wall expansion.  Cardiovascular: Regular rate and rhythm, S1/S2 normal, No murmurs, rubs or gallops.  Gastrointestinal: Soft, Non-tender, Non-distended, Normal bowel sounds, No palpable organomegaly.  Musculoskeletal:  Decreased range of motion to left lower extremity.  Integumentary: Warm, Dry, Intact, No suspicious lesions or rashes.  Extremities: No clubbing, cyanosis or edema  Neurologic: No focal deficits, Cranial Nerves II-XII are grossly intact, Motor strength normal upper and lower extremities, Sensory exam intact.  Psychiatric: Normal interaction, Coherent speech, Euthymic mood, Appropriate affect     Labs Reviewed:     Chemistry:  Lab Results   Component Value Date     11/21/2024    K 3.9 11/21/2024    BUN 7.3 11/21/2024    CREATININE 0.71 11/21/2024    EGFRNORACEVR >60 11/21/2024    GLUCOSE 114 (H) 11/21/2024    CALCIUM 9.8 11/21/2024    ALKPHOS 126 11/21/2024    LABPROT 8.1 11/21/2024    ALBUMIN 3.9 11/21/2024    BILIDIR 0.2 05/30/2021    IBILI 0.40 05/30/2021    AST 17 11/21/2024    ALT 18 11/21/2024    QDRIOWAJ25CP 27 (L) 10/15/2024    TSH 2.030 10/15/2024        Lab Results   Component Value Date    HGBA1C 6.0 10/15/2024        Hematology:  Lab Results   Component Value Date    WBC 6.45 10/15/2024    HGB 12.9 10/15/2024    HCT 40.3 10/15/2024     10/15/2024       Lipid Panel:  Lab Results   Component Value Date    CHOL 198 11/21/2024    HDL 55 11/21/2024    .00 11/21/2024    TRIG 115 11/21/2024    TOTALCHOLEST 4 11/21/2024        Urine:  Lab Results   Component Value Date    APPEARANCEUA SL CLOUDY  (A) 09/12/2024    SGUA >=1.030 09/12/2024    PROTEINUA Negative 09/12/2024    KETONESUA Negative 09/12/2024    LEUKOCYTESUR Negative 09/12/2024    RBCUA 0-5 09/12/2024    WBCUA 0-2 09/12/2024    BACTERIA Few (A) 09/12/2024          Assessment:       ICD-10-CM ICD-9-CM   1. Wellness examination  Z00.00 V70.0   2. Pre-diabetes  R73.03 790.29   3. Chronic bilateral low back pain without sciatica  M54.50 724.2    G89.29 338.29   4. Left hip pain  M25.552 719.45   5. Obesity, unspecified class, unspecified obesity type, unspecified whether serious comorbidity present  E66.9 278.00   6. Flu vaccine need  Z23 V04.81   7. Encounter for colorectal cancer screening  Z12.11 V76.51    Z12.12 V76.41        Plan:   1. Wellness examination (Primary)  Healthy lifestyle discussed.  Mammogram scheduled for December 18, 2024.    2. Pre-diabetes  Lab Results   Component Value Date    HGBA1C 6.0 10/15/2024    HGBA1C 5.6 09/02/2023    .00 11/21/2024    CREATININE 0.71 11/21/2024   Follow ADA Diet. Avoid soda, simple sweets, and limit rice/pasta/breads/starches (no more than 45-50 grams per meal).  Maintain healthy weight with goal BMI <30.  Exercise 5 times per week for 30 minutes per day.  Stressed importance of daily foot exams.  Stressed importance of annual dilated eye exam.    3. Chronic bilateral low back pain without sciatica  - X-Ray Lumbar Spine 5 View; Future  - meloxicam (MOBIC) 15 MG tablet; Take 1 tablet (15 mg total) by mouth once daily.  Dispense: 30 tablet; Refill: 3  - methocarbamoL (ROBAXIN) 500 MG Tab; Take 1 tablet (500 mg total) by mouth 4 (four) times daily. for 10 days  Dispense: 40 tablet; Refill: 0    4. Left hip pain  - X-Ray Hip 2 or 3 views Left with Pelvis when performed; Future    5. Obesity, unspecified class, unspecified obesity type, unspecified whether serious comorbidity present  Body mass index is 37.77 kg/m².  Goal BMI <30.  Exercise 5 times a week for 30 minutes per day.  Avoid soda, simple  sugars, excessive rice, potatoes or bread. Limit fast foods and fried foods.  Choose complex carbs in moderation (example: green vegetables, beans, oatmeal). Eat plenty of fresh fruits and vegetables with lean meats daily.  Do not skip meals. Eat a balanced portion size.  Avoid fad diets. Consider permanent healthy life style changes.     6. Flu vaccine need  - influenza (Flulaval, Fluzone, Fluarix) 45 mcg/0.5 mL IM vaccine (> or = 6 mo) 0.5 mL    7. Encounter for colorectal cancer screening  - Ambulatory referral/consult to Gastroenterology; Future      Follow up in about 6 months (around 6/4/2025) for DM, Hyperlipidemia. In addition to their scheduled follow up, the patient has also been instructed to follow up on as needed basis.     Future Appointments   Date Time Provider Department Center   12/18/2024  8:00 AM [facility]   6/5/2025  9:00 AM Elham Richter FNP [facility]   12/10/2025  1:00 PM Elham Richter FNP [facility]        ELLEN Strickland

## 2024-12-06 DIAGNOSIS — M54.50 CHRONIC BILATERAL LOW BACK PAIN WITHOUT SCIATICA: Primary | ICD-10-CM

## 2024-12-06 DIAGNOSIS — M25.552 LEFT HIP PAIN: ICD-10-CM

## 2024-12-06 DIAGNOSIS — G89.29 CHRONIC BILATERAL LOW BACK PAIN WITHOUT SCIATICA: Primary | ICD-10-CM

## 2024-12-19 DIAGNOSIS — M54.50 CHRONIC BILATERAL LOW BACK PAIN WITHOUT SCIATICA: Primary | ICD-10-CM

## 2024-12-19 DIAGNOSIS — G89.29 CHRONIC BILATERAL LOW BACK PAIN WITHOUT SCIATICA: Primary | ICD-10-CM

## 2025-01-08 ENCOUNTER — CLINICAL SUPPORT (OUTPATIENT)
Dept: REHABILITATION | Facility: HOSPITAL | Age: 50
End: 2025-01-08
Payer: MEDICAID

## 2025-01-08 DIAGNOSIS — G89.29 CHRONIC GROIN PAIN, LEFT: ICD-10-CM

## 2025-01-08 DIAGNOSIS — M54.9 TENDERNESS OF BACK: ICD-10-CM

## 2025-01-08 DIAGNOSIS — R29.898 DECREASED STRENGTH OF TRUNK AND BACK: ICD-10-CM

## 2025-01-08 DIAGNOSIS — M53.86 DECREASED ROM OF LUMBAR SPINE: ICD-10-CM

## 2025-01-08 DIAGNOSIS — R29.898 IMPAIRED FLEXIBILITY OF LOWER EXTREMITY: ICD-10-CM

## 2025-01-08 DIAGNOSIS — R10.32 CHRONIC GROIN PAIN, LEFT: ICD-10-CM

## 2025-01-08 DIAGNOSIS — G89.29 CHRONIC MIDLINE LOW BACK PAIN WITH LEFT-SIDED SCIATICA: Primary | ICD-10-CM

## 2025-01-08 DIAGNOSIS — M54.42 CHRONIC MIDLINE LOW BACK PAIN WITH LEFT-SIDED SCIATICA: Primary | ICD-10-CM

## 2025-01-08 PROCEDURE — 97162 PT EVAL MOD COMPLEX 30 MIN: CPT

## 2025-01-08 NOTE — PLAN OF CARE
[facility]   Physical Therapy Initial Evaluation      Name: Ramya SMITH [place]  Clinic Number: [id number]    Therapy Diagnosis:   Encounter Diagnoses   Name Primary?    Chronic midline low back pain with left-sided sciatica Yes    Impaired flexibility of lower extremity     Decreased strength of trunk and back     Tenderness of back     Chronic groin pain, left     Decreased ROM of lumbar spine         Physician: Elham Richter F*    Physician Orders: PT Eval and Treat, 2 wk 6  Medical Diagnosis from Referral: M54.42- Chronic midline low back pain with left sided sciatica  Evaluation Date: 1/8/2025  Authorization Period Expiration: TBD  Plan of Care Expiration: TBD  Reassessment / Plan of Care Due: 2/5/25  Visit # / Visits authorized: 0/      Functional lumbar FOTO score: 34 on initial eval (1/8/25)    Precautions: Standard     Time In: 0935  Time Out: 1025  Total Appointment Time (timed & untimed codes): 50 minutes    Subjective     Date of onset / History of current condition - Ramya reports: she has been having low back pain for 5-6 yrs since she had needle put in her back in hospital to run tests. L LE pain started about 1.5 yrs ago and comes and goes but pain has been getting worse. Pain in L LE is in her groin and down lateral LE to ankle. She also has Fibromyalgia and Vertigo which affects her mobility, must change positions slowly. She performs all ADLs and IADLs unassisted but has not been about to work in 5-6 yrs due to intermittent pain. Pt takes Ibuprofen 800 mg, Tylenol, and Meloxicam as needed for pain    Falls: yes, 3-4 due to dizziness    Imaging:   x-ray of lumbar spine (12/4/24): There are 5 non rib-bearing vertebral bodies vertebral bodies are normal height, position and alignment some marginal osteophytes are identified as well as some degenerative changes of the posterior elements at L5-S1. No acute fractures or dislocations identified no other bone pathology seen.  "Minimal degenerative changes     X-ray of hip (24): No acute displaced fractures or dislocations. There is narrowing of the inferior medial aspect of both hip joints with some degenerative changes of the sacroiliac joints and lumbosacral spine articular spaces are otherwise preserved with smooth articular surfaces    Prior Therapy: not for LBP, previously for neck pain  Social History:  lives alone  Occupation: not working  Prior Level of Function: modified Independent due to pain at community level without AD    Pain:  Current 8/10, worst 10/10, best 4/10   Location: across low back, L groin, L lateral LE to ankle  Description: Aching and comes and goes  Aggravating Factors: prolonged sitting, moving around too much, bending / lifting heavy objects  Easing Factors: laying down    Patients goals: "decrease back and left leg pain"     Medical History:   Past Medical History:   Diagnosis Date    Anxiety disorder, unspecified     Depression     Essential (primary) hypertension        Surgical History:   Ramya Terrell  has a past surgical history that includes Hysterectomy.    Medications:   Ramya has a current medication list which includes the following prescription(s): albuterol, albuterol, amitriptyline, amlodipine, benazepril, buspirone, cetirizine, ajovy autoinjector, meloxicam, mupirocin, nebulizer and compressor, rosuvastatin, trintellix, and vitamin d.    Allergies:   Review of patient's allergies indicates:   Allergen Reactions    Brice     Penicillin      Other Reaction(s): Unknown    Penicillins         Objective      Lumbar ROM:  B rotation: 75%- pain on R side of low back  Flexion: 75%- pain across low back  B side-bendin%- pain on R side of low back    Flexibility: muscle tightness in hamstrings, quadriceps, piriformis, QL L > R    B LE strength: 4/5 MMT throughout  Core muscle weakness: affecting bending / lifting objects, moving around too much    Posture: pelvis level, large anterior pull " on lumbar spine    Tenderness: with PA glides throughout lumbar spinous process, B paraspinal muscles L > R    Treatment     Total Treatment time (time-based codes) separate from Evaluation: 5 minutes     Ramya received the treatments listed below:      therapeutic exercises to develop strength, flexibility, and core stabilization for 5 minutes including:    manual therapy techniques: Manual traction and Soft tissue Mobilization were applied to the: lumbar spine for 0 minutes, including:    Therapeutic Exercise   Therapeutic Exercise Grid     Exercise 1  Exercise 2  Exercise 3  Exercise 4    Exercise :    B LE: stretches:  Hamstrings,  Quadriceps, piriformis,  QL LTR Bridges Abdominal press with B   Repetition/Time :                  Resist or Assist :                  Comment :                Done :                                Exercise 5  Exercise 6  Exercise 7  Exercise 8    Exercise :    DKTC with SB   L LE SLR,  L SL hip abd,  B prone hip ext Bird dog Dying bug   Repetition/Time :                  Resist or Assist :                  Comment :                  Done :                                  Exercise 9  Exercise 10  Exercise 11  Exercise 12    Exercise :    Sit to stands with extended weight   Lumbar traction NuStep (UE and LE)      Repetition/Time :                  Resist or Assist :                  Comment :                  Done :                                 Exercise 13 Exercise 14  Exercise 15  Exercise 16   Exercise :                  Repetition/Time :                  Resist or Assist :                  Comment :                  Done :                                  Patient Education and Home Exercises     Education provided:   - importance and benefits of performing HEP daily for optimal improvements even after discharged from PT  - importance of strengthening core muscles and decreased muscle tightness to reduce stress on lumbar spine with daily activities  - proper technique with  bending and lifting objects, maintaining good posture with daily activities to reduce stress on lumbar spine  - how lumbar traction works as treatment option in reducing LBP and radiating symptoms into LE  - anatomy of lumbar spine and how deficits cause pain and limit function     Written Home Exercises Provided: Patient instructed to cont prior HEP. Exercises were reviewed and Ramya was able to demonstrate them prior to the end of the session.  Ramya demonstrated good understanding of the education provided. See EMR under Patient Instructions for exercises provided during therapy sessions.    Assessment     Ramya is a 49 y.o. female referred to outpatient Physical Therapy with a medical diagnosis of chronic LBP and L LE pain. Patient presents with pain across lumbar spine and L LE, muscle tightness, decreased lumbar ROM, core muscle weakness, and tenderness in low back all affecting tolerance with daily activities. Pt would benefit from PT services to address pt's deficits    Patient prognosis is Good.   Patient will benefit from skilled outpatient Physical Therapy to address the deficits stated above and in the chart below, provide patient /family education, and to maximize patientt's level of independence.     Plan of care discussed with patient: YES  Patient's spiritual, cultural and educational needs considered and patient is agreeable to the plan of care and goals as stated below:     Anticipated Barriers for therapy: chronic low back and L LE pain    Medical Necessity is demonstrated by the following  History  Co-morbidities and personal factors that may impact the plan of care [] LOW: no personal factors / co-morbidities  [x] MODERATE: 1-2 personal factors / co-morbidities  [] HIGH: 3+ personal factors / co-morbidities    Moderate / High Support Documentation:     Co-morbidities affecting plan of care: see above     Examination  Body Structures and Functions, activity limitations and participation  restrictions that may impact the plan of care [] LOW: addressing 1-2 elements  [x] MODERATE: 3+ elements  [] HIGH: 4+ elements (please support below)    Moderate / High Support Documentation: see above     Clinical Presentation [] LOW: stable  [x] MODERATE: Evolving  [] HIGH: Unstable     Decision Making/ Complexity Score: moderate       Goals:    Short Term Goals: 4 weeks     Pt will demonstrate knowledge and independence with HEP to continue with exercises outside of therapy to facilitate optimal overall improvements    Pt will improve functional score on lumbar FOTO by >10 points which indicates improved ability to perform daily tasks with less difficulty and pain    Reduce c/o pain in lumbar and L LE to 6/10 to improve tolerance with daily activities    Improve strength in B LE to 4+/5 MMT throughout in order to improve tolerance with overall functional mobility    Long Term Goals: 6 weeks     Pt will improve functional score on lumbar FOTO by >20 points which indicates improved ability to perform daily tasks with less difficulty and pain    Reduce c/o pain in lumbar and L LE to 3/10 to improve tolerance with daily activities    Improve strength in B LE to 5/5 MMT throughout in order to improve tolerance with overall functional mobility    Improve flexibility of B LE in order to reduce mechanical stressors on the lumbar spine    Pt will demonstrate proper bending and lifting techniques to reduce stress on lumbar spine    Improve core strength as evidence by able to carry / lift heavy objects with <3/10 pain level    Plan     Plan of care Certification: TBD.    Outpatient Physical Therapy 2 times weekly for 6 weeks to include the following interventions: Cervical/Lumbar Traction, Manual Therapy, Patient Education, Therapeutic Exercise, and pain management .     Massiel Dumas, PT       I CERTIFY THE NEED FOR THESE SERVICES FURNISHED UNDER THIS PLAN OF TREATMENT AND WHILE UNDER MY CARE   Physician's comments:       Physician's Signature: ___________________________________________________

## 2025-01-14 DIAGNOSIS — M54.42 CHRONIC MIDLINE LOW BACK PAIN WITH LEFT-SIDED SCIATICA: Primary | ICD-10-CM

## 2025-01-14 DIAGNOSIS — G89.29 CHRONIC MIDLINE LOW BACK PAIN WITH LEFT-SIDED SCIATICA: Primary | ICD-10-CM

## 2025-01-24 ENCOUNTER — CLINICAL SUPPORT (OUTPATIENT)
Dept: REHABILITATION | Facility: HOSPITAL | Age: 50
End: 2025-01-24
Payer: MEDICAID

## 2025-01-24 DIAGNOSIS — G89.29 CHRONIC MIDLINE LOW BACK PAIN WITH LEFT-SIDED SCIATICA: Primary | ICD-10-CM

## 2025-01-24 DIAGNOSIS — M54.42 CHRONIC MIDLINE LOW BACK PAIN WITH LEFT-SIDED SCIATICA: Primary | ICD-10-CM

## 2025-01-24 PROCEDURE — 97110 THERAPEUTIC EXERCISES: CPT | Mod: CQ

## 2025-01-24 NOTE — PROGRESS NOTES
Outpatient Rehab    Physical Therapy Visit    Patient Name: Ramya Terrell  MRN: [id number]  YOB: 1975  Today's Date: 1/24/2025    Therapy Diagnosis:        Encounter Diagnoses   Name Primary?    Chronic midline low back pain with left-sided sciatica Yes    Impaired flexibility of lower extremity      Decreased strength of trunk and back      Tenderness of back      Chronic groin pain, left      Decreased ROM of lumbar spine      Physician: Elham Richter F*    Physician Orders: Eval and Treat  Medical Diagnosis: M54.42- Chronic midline low back pain with left sided sciatica    Visit # / Visits Authorized:  1 / 10   Date of Evaluation:  1/8/25  Insurance Authorization Period: 1/15/25 to 3/16/25  Plan of Care Certification:  1/15/25 to 3/16/25     Time In:1100   Time Out: 1145   Total time: 45 min         Subjective   Patient reported increasing pain in the lower back and right leg today. She stated usually her left hip hurts more but today its the right..  Pain reported as 7. no medication today    Past Medical History/Physical Systems Review:   Ramya Terrell  has a past medical history of Anxiety disorder, unspecified, Depression, and Essential (primary) hypertension.    Ramya Terrell  has a past surgical history that includes Hysterectomy.    Ramya has a current medication list which includes the following prescription(s): albuterol, albuterol, amitriptyline, amlodipine, benazepril, buspirone, cetirizine, ajovy autoinjector, meloxicam, mupirocin, nebulizer and compressor, rosuvastatin, trintellix, and vitamin d.    Review of patient's allergies indicates:   Allergen Reactions    [place]     Penicillin      Other Reaction(s): Unknown    Penicillins         Objective            Treatment:  Therapeutic Exercise  Therapeutic Exercise Activity 1: 3-way hip strengthening (see flow sheet)  Therapeutic Exercise Activity 2: Core strengthening exercises (see flow sheet)  Therapeutic Exercise  Activity 3: Bridges, LTR, sit to stands (see flow sheet)  Therapeutic Exercise Activity 4: Nustep warm up (see flow sheet for details)    Manual Therapy  Manual Therapy Activity 3: Hamstring, Piriformis, Hip flexor stretching    Patient's spiritual, cultural, and educational needs considered and patient agreeable to plan of care and goals.     Assessment & Plan   Assessment: Patient demonstrated a (+) outcome after the initiation of plan of care today as evidenced by ability to tolerate exercises with good effort. She is making a good progression toward goals as evidenced by exhibiting fair core control. However she continues to exhibit deficits with increasing pain and discomfort in lumbar spine and bilateral hips, lower extremity weakness, decreased muscle flexibility, and weakened abdominals. Recommend patient continue with POC and HEP to address deficits above.  Evaluation/Treatment Tolerance: Patient limited by pain  Education  Education was done with Patient. The patient's learning style includes Listening. The patient Verbalizes understanding.         Education provided:  - importance and benefits of performing HEP daily for optimal improvements even after discharged from PT - importance of strengthening core muscles and decreased muscle tightness to reduce stress on lumbar spine with daily activities - proper technique with bending and lifting objects, maintaining good posture with daily activities to reduce stress on lumbar spine - how lumbar traction works as treatment option in reducing LBP and radiating symptoms into LE - anatomy of lumbar spine and how deficits cause pain and limit function    Written Home Exercises Provided: Patient instructed to cont prior HEP. Exercises were reviewed and Ramya was able to demonstrate them prior to the end of the session.  Ramya demonstrated good understanding of the education provided. See EMR under Patient Instructions for exercises provided during therapy sessions.       Goals:     Short Term Goals: 4 weeks      Pt will demonstrate knowledge and independence with HEP to continue with exercises outside of therapy to facilitate optimal overall improvements     Pt will improve functional score on lumbar FOTO by >10 points which indicates improved ability to perform daily tasks with less difficulty and pain     Reduce c/o pain in lumbar and L LE to 6/10 to improve tolerance with daily activities     Improve strength in B LE to 4+/5 MMT throughout in order to improve tolerance with overall functional mobility     Long Term Goals: 6 weeks      Pt will improve functional score on lumbar FOTO by >20 points which indicates improved ability to perform daily tasks with less difficulty and pain     Reduce c/o pain in lumbar and L LE to 3/10 to improve tolerance with daily activities     Improve strength in B LE to 5/5 MMT throughout in order to improve tolerance with overall functional mobility     Improve flexibility of B LE in order to reduce mechanical stressors on the lumbar spine     Pt will demonstrate proper bending and lifting techniques to reduce stress on lumbar spine     Improve core strength as evidence by able to carry / lift heavy objects with <3/10 pain level    Plan: Continue plan of care.  Outpatient Physical Therapy 2 times weekly for 6 weeks to include the following interventions: Cervical/Lumbar Traction, Manual Therapy, Patient Education, Therapeutic Exercise, and pain management .

## 2025-01-27 NOTE — PROGRESS NOTES
Outpatient Rehab    Physical Therapy Visit    Patient Name: Ramya Terrell  MRN: [id number]  YOB: 1975  Today's Date: 1/28/2025    Therapy Diagnosis:        Encounter Diagnoses   Name Primary?    Chronic midline low back pain with left-sided sciatica Yes    Impaired flexibility of lower extremity      Decreased strength of trunk and back      Tenderness of back      Chronic groin pain, left      Decreased ROM of lumbar spine      Physician: Elham Richter F*    Physician Orders: Eval and Treat  Medical Diagnosis: M54.42- Chronic midline low back pain with left sided sciatica    PTA visits: 2/5  Visit # / Visits Authorized:  2 / 10   Date of Evaluation:  1/8/25  Insurance Authorization Period: 1/15/25 to 3/16/25  Plan of Care Certification:  1/15/25 to 3/16/25  Reassessment / Plan of Care Due: 2/5/25      Time In:  0840  Time Out:   0930  Total time: 45 min         Subjective   Patient reported she did work around her house and still had pain but it did not last as long..  Pain reported as 5. no medication today    Past Medical History/Physical Systems Review:   Ramya Terrell  has a past medical history of Anxiety disorder, unspecified, Depression, and Essential (primary) hypertension.    Ramya Terrell  has a past surgical history that includes Hysterectomy.    Ramya has a current medication list which includes the following prescription(s): albuterol, albuterol, amitriptyline, amlodipine, benazepril, buspirone, cetirizine, ajovy autoinjector, meloxicam, mupirocin, nebulizer and compressor, rosuvastatin, trintellix, and vitamin d.    Review of patient's allergies indicates:   Allergen Reactions    Brice     Penicillin      Other Reaction(s): Unknown    Penicillins         Objective            Treatment:  Therapeutic Exercise  Therapeutic Exercise Activity 1: 3-way hip strengthening (see flow sheet)  Therapeutic Exercise Activity 2: Core strengthening exercises (see flow sheet)  Therapeutic  Exercise Activity 3: Bridges (see flow sheet)  Therapeutic Exercise Activity 4: Nustep warm up (see flow sheet for details)    Manual Therapy  Manual Therapy Activity 1: Manual lumbar traction for ~5 minutes  Manual Therapy Activity 2: Muscle energy technique (see flow sheet for details)  Manual Therapy Activity 3: Hamstring, Piriformis, QL, and low trunk rotation stretching    Patient's spiritual, cultural, and educational needs considered and patient agreeable to plan of care and goals.     Assessment & Plan   Assessment: Patient demonstrated a (+) outcome after today's session as evidenced by reduced pain level. She is making an expected progression toward goals as evidenced by continued improvement with core strength and less pain with activity outside of PT sessions. However she continues to exhibit deficits with increasing pain and discomfort in lumbar spine and bilateral hips, lower extremity weakness, decreased muscle flexibility, and weakened abdominals. Mild difficulty with specific exercises however she was able to complete repetitions. Recommend patient continue with POC and HEP to address deficits above.  Evaluation/Treatment Tolerance: Patient tolerated treatment well        Goals:     Short Term Goals: 4 weeks      Pt will demonstrate knowledge and independence with HEP to continue with exercises outside of therapy to facilitate optimal overall improvements     Pt will improve functional score on lumbar FOTO by >10 points which indicates improved ability to perform daily tasks with less difficulty and pain     Reduce c/o pain in lumbar and L LE to 6/10 to improve tolerance with daily activities     Improve strength in B LE to 4+/5 MMT throughout in order to improve tolerance with overall functional mobility     Long Term Goals: 6 weeks      Pt will improve functional score on lumbar FOTO by >20 points which indicates improved ability to perform daily tasks with less difficulty and pain     Reduce c/o  pain in lumbar and L LE to 3/10 to improve tolerance with daily activities     Improve strength in B LE to 5/5 MMT throughout in order to improve tolerance with overall functional mobility     Improve flexibility of B LE in order to reduce mechanical stressors on the lumbar spine     Pt will demonstrate proper bending and lifting techniques to reduce stress on lumbar spine     Improve core strength as evidence by able to carry / lift heavy objects with <3/10 pain level    Plan: Continue plan of care and HEP and progress as patient is able.  Outpatient Physical Therapy 2 times weekly for 6 weeks to include the following interventions: Cervical/Lumbar Traction, Manual Therapy, Patient Education, Therapeutic Exercise, and pain management .

## 2025-01-28 ENCOUNTER — CLINICAL SUPPORT (OUTPATIENT)
Dept: REHABILITATION | Facility: HOSPITAL | Age: 50
End: 2025-01-28
Payer: MEDICAID

## 2025-01-28 DIAGNOSIS — M54.42 CHRONIC MIDLINE LOW BACK PAIN WITH LEFT-SIDED SCIATICA: Primary | ICD-10-CM

## 2025-01-28 DIAGNOSIS — G89.29 CHRONIC MIDLINE LOW BACK PAIN WITH LEFT-SIDED SCIATICA: Primary | ICD-10-CM

## 2025-01-28 DIAGNOSIS — R29.898 IMPAIRED FLEXIBILITY OF LOWER EXTREMITY: ICD-10-CM

## 2025-01-28 DIAGNOSIS — R29.898 DECREASED STRENGTH OF TRUNK AND BACK: ICD-10-CM

## 2025-01-28 PROCEDURE — 97110 THERAPEUTIC EXERCISES: CPT | Mod: CQ

## 2025-01-29 DIAGNOSIS — I10 HYPERTENSION, UNSPECIFIED TYPE: ICD-10-CM

## 2025-01-29 RX ORDER — BENAZEPRIL HYDROCHLORIDE 40 MG/1
TABLET ORAL
Qty: 30 TABLET | Refills: 3 | Status: SHIPPED | OUTPATIENT
Start: 2025-01-29

## 2025-01-29 RX ORDER — AMLODIPINE BESYLATE 5 MG/1
5 TABLET ORAL DAILY
Qty: 30 TABLET | Refills: 3 | Status: SHIPPED | OUTPATIENT
Start: 2025-01-29 | End: 2026-01-29

## 2025-01-30 ENCOUNTER — CLINICAL SUPPORT (OUTPATIENT)
Dept: REHABILITATION | Facility: HOSPITAL | Age: 50
End: 2025-01-30
Payer: MEDICAID

## 2025-01-30 DIAGNOSIS — M54.9 TENDERNESS OF BACK: ICD-10-CM

## 2025-01-30 DIAGNOSIS — R29.898 IMPAIRED FLEXIBILITY OF LOWER EXTREMITY: ICD-10-CM

## 2025-01-30 DIAGNOSIS — M54.42 CHRONIC MIDLINE LOW BACK PAIN WITH LEFT-SIDED SCIATICA: Primary | ICD-10-CM

## 2025-01-30 DIAGNOSIS — G89.29 CHRONIC MIDLINE LOW BACK PAIN WITH LEFT-SIDED SCIATICA: Primary | ICD-10-CM

## 2025-01-30 PROCEDURE — 97110 THERAPEUTIC EXERCISES: CPT | Mod: CQ

## 2025-01-30 NOTE — PROGRESS NOTES
Outpatient Rehab    Physical Therapy Visit    Patient Name: Ramya Terrell  MRN: [id number]  YOB: 1975  Today's Date: 1/30/2025    Therapy Diagnosis: No diagnosis found.  Physician: Elham Richter F*    Physician Orders: Eval and Treat  Medical Diagnosis: M54.42- Chronic midline low back pain with left sided sciatica     PTA visits: 3/5  Visit # / Visits Authorized:  3 / 10   Date of Evaluation:  1/8/25  Insurance Authorization Period: 1/15/25 to 3/16/25  Plan of Care Certification:  1/15/25 to 3/16/25  Reassessment / Plan of Care Due: 2/5/25      Time In: 0845   Time Out: 0930  Total Time: 45   Total Billable Time: 45         Subjective   Pt states her back pain is about the same, but she was also having some spasms in her L upper traps yesterday during the day and it kept her from sleeping well last night..  Pain reported as 6. with Tylenol    Objective            Treatment:  Other Activity  Other Activity 1: Mechanical lumbar traction- 70/30 lb, 30/15 sec, 25 min  Other Activity 2: Hamstring/piriformis/QL stretch- 3x30 sec  Other Activity 3: NuStep- 5 min / Seat 7/ Level 4/ Arms 8    Patient's spiritual, cultural, and educational needs considered and patient agreeable to plan of care and goals.     Assessment & Plan   Assessment: After being cleared for contraindications, Ramya demonstrated a positive outcome after mechanical lumbar traction as evidenced by report of reduced pain level. She is making an expected progression toward goals as evidenced by continued improvement with core strength and less pain with activity outside of PT sessions. However she continues to exhibit deficits with increasing pain and discomfort in lumbar spine and bilateral hips, lower extremity weakness, decreased muscle flexibility, and weakened abdominals. Mild difficulty with specific exercises however she was able to complete repetitions. Recommend patient continue with POC and HEP to address deficits above.            Plan: Continue with current plan of care.    Goals: Short Term Goals: 4 weeks      Pt will demonstrate knowledge and independence with HEP to continue with exercises outside of therapy to facilitate optimal overall improvements     Pt will improve functional score on lumbar FOTO by >10 points which indicates improved ability to perform daily tasks with less difficulty and pain     Reduce c/o pain in lumbar and L LE to 6/10 to improve tolerance with daily activities     Improve strength in B LE to 4+/5 MMT throughout in order to improve tolerance with overall functional mobility     Long Term Goals: 6 weeks      Pt will improve functional score on lumbar FOTO by >20 points which indicates improved ability to perform daily tasks with less difficulty and pain     Reduce c/o pain in lumbar and L LE to 3/10 to improve tolerance with daily activities     Improve strength in B LE to 5/5 MMT throughout in order to improve tolerance with overall functional mobility     Improve flexibility of B LE in order to reduce mechanical stressors on the lumbar spine     Pt will demonstrate proper bending and lifting techniques to reduce stress on lumbar spine     Improve core strength as evidence by able to carry / lift heavy objects with <3/10 pain level

## 2025-02-05 ENCOUNTER — CLINICAL SUPPORT (OUTPATIENT)
Dept: REHABILITATION | Facility: HOSPITAL | Age: 50
End: 2025-02-05
Payer: MEDICAID

## 2025-02-05 ENCOUNTER — DOCUMENTATION ONLY (OUTPATIENT)
Dept: REHABILITATION | Facility: HOSPITAL | Age: 50
End: 2025-02-05
Payer: MEDICAID

## 2025-02-05 DIAGNOSIS — R29.898 DECREASED STRENGTH OF TRUNK AND BACK: ICD-10-CM

## 2025-02-05 DIAGNOSIS — G89.29 CHRONIC MIDLINE LOW BACK PAIN WITH LEFT-SIDED SCIATICA: Primary | ICD-10-CM

## 2025-02-05 DIAGNOSIS — M54.42 CHRONIC MIDLINE LOW BACK PAIN WITH LEFT-SIDED SCIATICA: Primary | ICD-10-CM

## 2025-02-05 DIAGNOSIS — G89.29 CHRONIC GROIN PAIN, LEFT: ICD-10-CM

## 2025-02-05 DIAGNOSIS — M54.9 TENDERNESS OF BACK: ICD-10-CM

## 2025-02-05 DIAGNOSIS — R10.32 CHRONIC GROIN PAIN, LEFT: ICD-10-CM

## 2025-02-05 DIAGNOSIS — R29.898 IMPAIRED FLEXIBILITY OF LOWER EXTREMITY: ICD-10-CM

## 2025-02-05 DIAGNOSIS — M53.86 DECREASED ROM OF LUMBAR SPINE: ICD-10-CM

## 2025-02-05 PROCEDURE — 97110 THERAPEUTIC EXERCISES: CPT

## 2025-02-05 NOTE — PROGRESS NOTES
Outpatient Rehab    Physical Therapy Progress Note    Patient Name: Ramya Terrell  MRN: [id number]  YOB: 1975  Today's Date: 2/5/2025    Therapy Diagnosis:   Encounter Diagnoses   Name Primary?    Chronic midline low back pain with left-sided sciatica Yes    Impaired flexibility of lower extremity     Decreased strength of trunk and back     Tenderness of back     Chronic groin pain, left     Decreased ROM of lumbar spine      Physician: Elham Richter F*    Physician Orders: Eval and Treat  Medical Diagnosis:  M54.42- Chronic midline low back pain with left sided sciatica     PTA visits: 0/5  Visit # / Visits Authorized:  4/ 10   Date of Evaluation:  1/8/25  Insurance Authorization Period: 1/15/25 to 3/16/25  Plan of Care Certification:  1/15/25 to 3/16/25  Reassessment / Plan of Care completed: RA- 2/5/25    Next RA / POC due: 2/18/25     Time In: 0930   Time Out: 1015  Total Time: 45          Subjective she feels like her back spasms have been stronger and more frequent, reduced pain in L LE. She performs some of HEP outside of therapy. She does not take medication for pain like she should, she should take it before pain gets too bad   Functional lumbar FOTO score: 40 (1/24/25), 34 on initial eval (1/8/25)    Pain     Patient reports a current pain level of 8/10.        low back, L LE        Objective   Posture    Increased lumbar lordosis is observed.                 Spinal Muscle Palpation     Increased tenderness in B paraspinal muscles L > R                Lumbar Range of Motion   Active (deg) Passive (deg) Pain   Flexion 75   Yes   Extension         Right Lateral Flexion 75   Yes   Right Rotation 75   Yes   Left Lateral Flexion 75   Yes   Left Rotation 75   Yes     Measurements are in percentage of movement. C/o mild pain with all movements             Intake Outcome Measure for FOTO Survey    Therapist reviewed FOTO scores for Ramya Terrell on 2/5/2025.   FOTO report - see Media  section or FOTO account episode details.     Intake Score: 34%  Survey Score 1: 40%  Survey Score 2:  %    Treatment:  Therapeutic Exercise  Therapeutic Exercise Activity 2: Core strengthening exercises (see flow sheet)  Therapeutic Exercise Activity 3: core / LE stretches (see exercise sheet)  Therapeutic Exercise Activity 4: Nustep warm up (see flow sheet for details)    Other Activity  Other Activity 1: Mechanical lumbar traction (see exercise sheet)    Patient's spiritual, cultural, and educational needs considered and patient agreeable to plan of care and goals.     Assessment & Plan   Assessment: Pt has completed 4 PT visits since initial eval. Pt progressing slowly with reduction of pain in lumbar spine and L LE, lumbar ROM, reduced muscle tightness, core and LE strengthening, and muscle spasms continuing to limit tolerance with daily activities as evidence by minimal improvement in functional lumbar FOTO score. Pt unable to tolerate full mechanical lumbar traction treatment today due to increased pulling and pain in lumbar spine into L LE. Discussed continued limitations and importance of continuing with HEP and attending PT to facilitate reduction of pain           Plan: Pt to continue with current POC, progress per pt's tolerance, and reassess pt at later date to determine need for additional therapy    Goals:    Short Term Goals: 4 weeks      Pt will demonstrate knowledge and independence with HEP to continue with exercises outside of therapy to facilitate optimal overall improvements- progressing     Pt will improve functional score on lumbar FOTO by >10 points which indicates improved ability to perform daily tasks with less difficulty and pain- progressing (40 (1/24/25), 34 on initial eval (1/8/25) )     Reduce c/o pain in lumbar and L LE to 6/10 to improve tolerance with daily activities- not met (c/o 5-8/10 pain level)     Improve strength in B LE to 4+/5 MMT throughout in order to improve tolerance  with overall functional mobility- not met     Long Term Goals: 6 weeks      Pt will improve functional score on lumbar FOTO by >20 points which indicates improved ability to perform daily tasks with less difficulty and pain     Reduce c/o pain in lumbar and L LE to 3/10 to improve tolerance with daily activities     Improve strength in B LE to 5/5 MMT throughout in order to improve tolerance with overall functional mobility     Improve flexibility of B LE in order to reduce mechanical stressors on the lumbar spine     Pt will demonstrate proper bending and lifting techniques to reduce stress on lumbar spine     Improve core strength as evidence by able to carry / lift heavy objects with <3/10 pain level     Massiel Dumas, PT

## 2025-02-05 NOTE — PROGRESS NOTES
Performed face to face conference with Anita Quintero PTA, regarding pt's POC and progress thus far

## 2025-02-05 NOTE — PROGRESS NOTES
Performed face to face conference with Jose Carlos Young PTA, regarding pt's POC and progress thus far

## 2025-02-07 ENCOUNTER — CLINICAL SUPPORT (OUTPATIENT)
Dept: REHABILITATION | Facility: HOSPITAL | Age: 50
End: 2025-02-07
Payer: MEDICAID

## 2025-02-07 DIAGNOSIS — R29.898 DECREASED STRENGTH OF TRUNK AND BACK: ICD-10-CM

## 2025-02-07 DIAGNOSIS — M54.42 CHRONIC MIDLINE LOW BACK PAIN WITH LEFT-SIDED SCIATICA: Primary | ICD-10-CM

## 2025-02-07 DIAGNOSIS — G89.29 CHRONIC MIDLINE LOW BACK PAIN WITH LEFT-SIDED SCIATICA: Primary | ICD-10-CM

## 2025-02-07 DIAGNOSIS — M53.86 DECREASED ROM OF LUMBAR SPINE: ICD-10-CM

## 2025-02-07 DIAGNOSIS — R10.32 CHRONIC GROIN PAIN, LEFT: ICD-10-CM

## 2025-02-07 DIAGNOSIS — R29.898 IMPAIRED FLEXIBILITY OF LOWER EXTREMITY: ICD-10-CM

## 2025-02-07 DIAGNOSIS — M54.9 TENDERNESS OF BACK: ICD-10-CM

## 2025-02-07 DIAGNOSIS — G89.29 CHRONIC GROIN PAIN, LEFT: ICD-10-CM

## 2025-02-07 PROCEDURE — 97110 THERAPEUTIC EXERCISES: CPT | Mod: CQ

## 2025-02-07 NOTE — PROGRESS NOTES
Outpatient Rehab         Physical Therapy Visit      Patient Name: Ramya Terrell  MRN: [id number]  YOB: 1975  Today's Date: 2/7/2025    Therapy Diagnosis:   Encounter Diagnoses   Name Primary?    Chronic midline low back pain with left-sided sciatica Yes    Impaired flexibility of lower extremity     Decreased strength of trunk and back     Tenderness of back     Chronic groin pain, left     Decreased ROM of lumbar spine      Physician: Elham Richter F*    Physician Orders: Eval and Treat  Medical Diagnosis:  M54.42- Chronic midline low back pain with left sided sciatica     PTA visits: 1/5  Visit # / Visits Authorized:  5/10   Date of Evaluation:  1/8/25  Insurance Authorization Period: 1/15/25 to 3/16/25  Plan of Care Certification:  1/15/25 to 3/16/25  Next RA / POC due: 2/18/25     Time In: 0930   Time Out: 1020  Total Time: 50          Subjective  She had increased pain while doing mechanical traction the last PT session and had to stop. Today the pain is not as bad but it continues in the lower back and front of the left thigh / leg.       Pain     Patient reports a current pain level of 5/10.        low back, L LE        Objective             Intake Outcome Measure for FOTO Survey    Therapist reviewed FOTO scores for Ramya Terrell on 2/7/2025.   FOTO report - see Media section or FOTO account episode details.     Intake Score:  %  Survey Score 1: 46%  Survey Score 2:  %    Treatment:  Therapeutic Exercise  Therapeutic Exercise Activity 1: 3-way hip strengthening (see flow sheet)  Therapeutic Exercise Activity 2: Supine core strengthening exercises (see flow sheet)  Therapeutic Exercise Activity 3: Bilateral hip strengthening exercises (see exercise sheet)  Therapeutic Exercise Activity 4: Nustep warm up (see flow sheet for details)         Patient's spiritual, cultural, and educational needs considered and patient agreeable to plan of care and goals.     Assessment & Plan    Assessment: Patient demonstrated a (+) outcome after today's session as evidenced by report of reduced pain since last PT session and good effort with increased repetitions. She is making an expected progression toward goals as evidenced by increased in functional FOTO score from 40 to 46. However she continues to exhibit deficits with intermittent low back pain, weak core, and decreased lumbar stabilization.  Evaluation/Treatment Tolerance: Patient tolerated treatment well  Education  Education was done with Patient. The patient's learning style includes Listening. The patient Verbalizes understanding.         Education provided:  - importance and benefits of performing HEP daily for optimal improvements even after discharged from PT - importance of strengthening core muscles and decreased muscle tightness to reduce stress on lumbar spine with daily activities - proper technique with bending and lifting objects, maintaining good posture with daily activities to reduce stress on lumbar spine - how lumbar traction works as treatment option in reducing LBP and radiating symptoms into LE - anatomy of lumbar spine and how deficits cause pain and limit function    Written Home Exercises Provided: Patient instructed to cont prior HEP. Exercises were reviewed and Ramya was able to demonstrate them prior to the end of the session.  Ramya demonstrated good understanding of the education provided. See EMR under Patient Instructions for exercises provided during therapy sessions.        Plan: Pt to continue with current POC, progress per pt's tolerance, and reassess pt at later date to determine need for additional therapy    Goals:    Short Term Goals: 4 weeks      Pt will demonstrate knowledge and independence with HEP to continue with exercises outside of therapy to facilitate optimal overall improvements- progressing     Pt will improve functional score on lumbar FOTO by >10 points which indicates improved ability to  perform daily tasks with less difficulty and pain- progressing (40 (1/24/25), 34 on initial eval (1/8/25) )     Reduce c/o pain in lumbar and L LE to 6/10 to improve tolerance with daily activities- not met (c/o 5-8/10 pain level)     Improve strength in B LE to 4+/5 MMT throughout in order to improve tolerance with overall functional mobility- not met     Long Term Goals: 6 weeks      Pt will improve functional score on lumbar FOTO by >20 points which indicates improved ability to perform daily tasks with less difficulty and pain     Reduce c/o pain in lumbar and L LE to 3/10 to improve tolerance with daily activities     Improve strength in B LE to 5/5 MMT throughout in order to improve tolerance with overall functional mobility     Improve flexibility of B LE in order to reduce mechanical stressors on the lumbar spine     Pt will demonstrate proper bending and lifting techniques to reduce stress on lumbar spine     Improve core strength as evidence by able to carry / lift heavy objects with <3/10 pain level     Anita Quintero, PTA

## 2025-02-11 ENCOUNTER — CLINICAL SUPPORT (OUTPATIENT)
Dept: REHABILITATION | Facility: HOSPITAL | Age: 50
End: 2025-02-11
Payer: MEDICAID

## 2025-02-11 DIAGNOSIS — R10.32 CHRONIC GROIN PAIN, LEFT: ICD-10-CM

## 2025-02-11 DIAGNOSIS — G89.29 CHRONIC MIDLINE LOW BACK PAIN WITH LEFT-SIDED SCIATICA: Primary | ICD-10-CM

## 2025-02-11 DIAGNOSIS — R29.898 IMPAIRED FLEXIBILITY OF LOWER EXTREMITY: ICD-10-CM

## 2025-02-11 DIAGNOSIS — G89.29 CHRONIC GROIN PAIN, LEFT: ICD-10-CM

## 2025-02-11 DIAGNOSIS — M54.9 TENDERNESS OF BACK: ICD-10-CM

## 2025-02-11 DIAGNOSIS — R29.898 DECREASED STRENGTH OF TRUNK AND BACK: ICD-10-CM

## 2025-02-11 DIAGNOSIS — M54.42 CHRONIC MIDLINE LOW BACK PAIN WITH LEFT-SIDED SCIATICA: Primary | ICD-10-CM

## 2025-02-11 DIAGNOSIS — M53.86 DECREASED ROM OF LUMBAR SPINE: ICD-10-CM

## 2025-02-11 PROCEDURE — 97110 THERAPEUTIC EXERCISES: CPT | Mod: CQ

## 2025-02-11 NOTE — PROGRESS NOTES
Outpatient Rehab         Physical Therapy Visit      Patient Name: Ramya Terrell  MRN: [id number]  YOB: 1975  Today's Date: 2/11/2025    Therapy Diagnosis:   Encounter Diagnoses   Name Primary?    Chronic midline low back pain with left-sided sciatica Yes    Impaired flexibility of lower extremity     Decreased strength of trunk and back     Tenderness of back     Chronic groin pain, left     Decreased ROM of lumbar spine      Physician: Elham Richter F*    Physician Orders: Eval and Treat  Medical Diagnosis:  M54.42- Chronic midline low back pain with left sided sciatica     PTA visits: 2/5  Visit # / Visits Authorized:  6/10   Date of Evaluation:  1/8/25  Insurance Authorization Period: 1/15/25 to 3/16/25  Plan of Care Certification:  1/15/25 to 3/16/25  Next RA / POC due: 2/18/25     Time In: 1333   Time Out: 1415  Total Time: 42          Subjective  She had increased pain while doing mechanical traction the last PT session and had to stop. Today the pain is not as bad but it continues in the lower back and front of the left thigh / leg.       Pain     Patient reports a current pain level of 4/10.        low back, L LE        Objective             Intake Outcome Measure for FOTO Survey    Therapist reviewed FOTO scores for Ramya Terrell on 2/11/2025.   FOTO report - see Media section or FOTO account episode details.     Intake Score:  %  Survey Score 1:  %  Survey Score 2:  %    Treatment:  Therapeutic Exercise  Therapeutic Exercise Activity 1: 3-way hip strengthening (see flow sheet)  Therapeutic Exercise Activity 2: Supine core strengthening exercises (see flow sheet)  Therapeutic Exercise Activity 3: Bilateral hip strengthening exercises (see exercise sheet)  Therapeutic Exercise Activity 4: Nustep warm up (see flow sheet for details)         Patient's spiritual, cultural, and educational needs considered and patient agreeable to plan of care and goals.     Assessment & Plan    Assessment: Patient demonstrated a (+) outcome after today's session as evidenced by good effort with additional exercises. Added bird dog exercise to improve dynamic core control and lumbar stabilization. She is making an expected progression toward goals as evidenced by gradual decrease in pain level from 8/10 to 4/10 without medication. However she continues to exhibit deficits with intermittent low back pain, weak core, and decreased lumbar stabilization.  Evaluation/Treatment Tolerance: Patient tolerated treatment well  Education  Education was done with Patient. The patient's learning style includes Listening. The patient Verbalizes understanding.         Education provided:  - importance and benefits of performing HEP daily for optimal improvements even after discharged from PT - importance of strengthening core muscles and decreased muscle tightness to reduce stress on lumbar spine with daily activities - proper technique with bending and lifting objects, maintaining good posture with daily activities to reduce stress on lumbar spine - how lumbar traction works as treatment option in reducing LBP and radiating symptoms into LE - anatomy of lumbar spine and how deficits cause pain and limit function    Written Home Exercises Provided: Patient instructed to cont prior HEP. Exercises were reviewed and Ramya was able to demonstrate them prior to the end of the session.  Ramya demonstrated good understanding of the education provided. See EMR under Patient Instructions for exercises provided during therapy sessions.        Plan: Pt to continue with current POC, progress per pt's tolerance, and reassess pt at later date to determine need for additional therapy    Goals:    Short Term Goals: 4 weeks      Pt will demonstrate knowledge and independence with HEP to continue with exercises outside of therapy to facilitate optimal overall improvements- progressing     Pt will improve functional score on lumbar FOTO by  >10 points which indicates improved ability to perform daily tasks with less difficulty and pain- progressing (40 (1/24/25), 34 on initial eval (1/8/25) )     Reduce c/o pain in lumbar and L LE to 6/10 to improve tolerance with daily activities- not met (c/o 5-8/10 pain level)     Improve strength in B LE to 4+/5 MMT throughout in order to improve tolerance with overall functional mobility- not met     Long Term Goals: 6 weeks      Pt will improve functional score on lumbar FOTO by >20 points which indicates improved ability to perform daily tasks with less difficulty and pain     Reduce c/o pain in lumbar and L LE to 3/10 to improve tolerance with daily activities     Improve strength in B LE to 5/5 MMT throughout in order to improve tolerance with overall functional mobility     Improve flexibility of B LE in order to reduce mechanical stressors on the lumbar spine     Pt will demonstrate proper bending and lifting techniques to reduce stress on lumbar spine     Improve core strength as evidence by able to carry / lift heavy objects with <3/10 pain level     Anita Quintero, PTA

## 2025-02-18 ENCOUNTER — DOCUMENTATION ONLY (OUTPATIENT)
Dept: REHABILITATION | Facility: HOSPITAL | Age: 50
End: 2025-02-18

## 2025-02-18 ENCOUNTER — CLINICAL SUPPORT (OUTPATIENT)
Dept: REHABILITATION | Facility: HOSPITAL | Age: 50
End: 2025-02-18
Payer: MEDICAID

## 2025-02-18 DIAGNOSIS — M54.42 CHRONIC MIDLINE LOW BACK PAIN WITH LEFT-SIDED SCIATICA: Primary | ICD-10-CM

## 2025-02-18 DIAGNOSIS — R10.32 CHRONIC GROIN PAIN, LEFT: ICD-10-CM

## 2025-02-18 DIAGNOSIS — G89.29 CHRONIC MIDLINE LOW BACK PAIN WITH LEFT-SIDED SCIATICA: Primary | ICD-10-CM

## 2025-02-18 DIAGNOSIS — G89.29 CHRONIC GROIN PAIN, LEFT: ICD-10-CM

## 2025-02-18 DIAGNOSIS — R29.898 IMPAIRED FLEXIBILITY OF LOWER EXTREMITY: ICD-10-CM

## 2025-02-18 DIAGNOSIS — M54.9 TENDERNESS OF BACK: ICD-10-CM

## 2025-02-18 DIAGNOSIS — M53.86 DECREASED ROM OF LUMBAR SPINE: ICD-10-CM

## 2025-02-18 DIAGNOSIS — R29.898 DECREASED STRENGTH OF TRUNK AND BACK: ICD-10-CM

## 2025-02-18 PROCEDURE — 97110 THERAPEUTIC EXERCISES: CPT

## 2025-02-18 NOTE — PROGRESS NOTES
Outpatient Rehab    Physical Therapy Progress Note : Updated Plan of Care    Patient Name: Ramya Terrell  MRN: [id number]  YOB: 1975  Today's Date: 2/18/2025    Therapy Diagnosis:   Encounter Diagnoses   Name Primary?    Chronic midline low back pain with left-sided sciatica Yes    Impaired flexibility of lower extremity     Decreased strength of trunk and back     Tenderness of back     Chronic groin pain, left     Decreased ROM of lumbar spine      Physician: Elham Richter F*    Physician Orders: Eval and Treat  Medical Diagnosis: M54.42- Chronic midline low back pain with left sided sciatica     PTA visits: 2/5  Visit # / Visits Authorized:  7/10   Date of Evaluation:  1/8/25  Insurance Authorization Period: 1/15/25 to 3/16/25  Plan of Care Certification:  1/15/25 to 3/16/25  RA / POC completed: RA-2/5/25, POC- 2/18/25  Next RA / POC due: 3/18/25     Time In: 0930   Time Out: 1015  Total Time: 45     FOTO:  Intake Score: 34%  Survey Score 1: 46%  Survey Score 2:  %         Subjective   pt reports severe pain is less frequent however when she has pain it is still severe. pain in L LE is less severe. She performs some of HEP outside of therapy. No medication taken for pain.  Pain reported as 3/10. low back, L LE    Objective      Lumbar Range of Motion   Active (deg) Passive (deg) Pain   Flexion 75   Yes   Extension         Right Lateral Flexion 75   Yes   Right Rotation 75   Yes   Left Lateral Flexion 75   Yes   Left Rotation 75   Yes     ROM measured in percentage of available movement. Mild pain in all directions except B side-bending moderate pain              Treatment:  Therapeutic Exercise  Therapeutic Exercise Activity 4: Nustep warm up (see flow sheet for details)    Other Activity  Other Activity 1: Mechanical lumbar traction (see exercise sheet)    Assessment & Plan Pt has completed 7 PT visits since initial eval. Pt progressing slowly with reduction of lumbar and L LE pain,  radiating symptoms in L LE, lumbar ROM, muscle tightness, core and LE strengthening, and overall functional mobility continuing to limit tolerance with daily activities as evidence by functional lumbar FOTO score. Pt does reports less severe and frequent radiating symptoms in L LE. Pt has poor tolerance with mechanical lumbar traction attempted a few PT session with different settings and angle of pull. Discussed importance of performing HEP daily for optimal improvements. Pt would benefit from continued PT 2 wk 4 to further address pt's deficits        Patient will continue to benefit from skilled outpatient physical therapy to address the deficits listed in the problem list box on initial evaluation, provide pt/family education and to maximize pt's level of independence in the home and community environment.     Patient's spiritual, cultural, and educational needs considered and patient agreeable to plan of care and goals.           Goals:   Active       PT goals       Pt will demonstrate knowledge and independent with HEP to continue with exercises outside of therapy to facilitate optimal overall improvements (Progressing)       Start:  02/18/25    Expected End:  03/28/25            Pt will improve functional score on lumbar FOTO by >20 points which indicates improved ability to perform daily tasks with less difficulty and pain (Progressing)       Start:  02/18/25    Expected End:  03/28/25            Reduce c/o pain in lumbar and L LE to <3/10 pain level to improve tolerance with daily activities (Progressing)       Start:  02/18/25    Expected End:  03/28/25            Improve strength in B LE to 5/5 MMT throughout in order to improve tolerance with overall functional mobility (Progressing)       Start:  02/18/25    Expected End:  03/28/25            Improve flexibility of B LE in order to reduce mechanical stressors on the lumbar spine (Progressing)       Start:  02/18/25    Expected End:  03/28/25                continued PT goals       Pt will demonstrate proper bending and lifting techniques to reduce stress on lumbar spine (Progressing)       Start:  02/18/25    Expected End:  03/28/25            Improve core strength as evidence by able to carry / lift heavy objects with < 3/10 pain level (Progressing)       Start:  02/18/25    Expected End:  03/28/25                Massiel Dumas, PT

## 2025-02-20 ENCOUNTER — CLINICAL SUPPORT (OUTPATIENT)
Dept: REHABILITATION | Facility: HOSPITAL | Age: 50
End: 2025-02-20
Payer: MEDICAID

## 2025-02-20 DIAGNOSIS — G89.29 CHRONIC MIDLINE LOW BACK PAIN WITH LEFT-SIDED SCIATICA: ICD-10-CM

## 2025-02-20 DIAGNOSIS — R29.898 IMPAIRED FLEXIBILITY OF LOWER EXTREMITY: ICD-10-CM

## 2025-02-20 DIAGNOSIS — M79.7 FIBROMYALGIA: ICD-10-CM

## 2025-02-20 DIAGNOSIS — M54.42 CHRONIC MIDLINE LOW BACK PAIN WITH LEFT-SIDED SCIATICA: ICD-10-CM

## 2025-02-20 DIAGNOSIS — R29.898 DECREASED STRENGTH OF TRUNK AND BACK: ICD-10-CM

## 2025-02-20 DIAGNOSIS — M54.9 TENDERNESS OF BACK: Primary | ICD-10-CM

## 2025-02-20 PROCEDURE — 97110 THERAPEUTIC EXERCISES: CPT

## 2025-02-20 PROCEDURE — 97140 MANUAL THERAPY 1/> REGIONS: CPT

## 2025-02-20 PROCEDURE — 97112 NEUROMUSCULAR REEDUCATION: CPT

## 2025-02-20 NOTE — PROGRESS NOTES
Outpatient Rehab    Physical Therapy Visit    Patient Name: Ramya Terrell  MRN: [id number]  YOB: 1975  Today's Date: 2/20/2025    Therapy Diagnosis:   Encounter Diagnoses   Name Primary?    Tenderness of back Yes    Decreased strength of trunk and back     Impaired flexibility of lower extremity     Chronic midline low back pain with left-sided sciatica     Fibromyalgia      Physician: Elham Richter F*    Physician Orders: Eval and Treat  Medical Diagnosis: M54.42- Chronic midline low back pain with left sided sciatica     PTA visits: 2/5  Visit # / Visits Authorized:  8/10   Date of Evaluation:  1/8/25  Insurance Authorization Period: 1/15/25 to 3/16/25  Plan of Care Certification:  1/15/25 to 3/16/25  RA / POC completed: RA-2/5/25, POC- 2/18/25  Next RA / POC due: 3/18/25     Time In: 0937   Time Out: 1017  Total Time: 40   Total Billable Time: 40 minutes    FOTO:  Intake Score:  %  Survey Score 1:  %  Survey Score 2:  %         Subjective   The pt states her LLE pain (lateral thigh and into L lateral ankle) still persists, especially after being in prolonged sedentary positions..  Pain reported as 3/10. L Lateral thigh and into L lateral ankle    Objective            Treatment:  Therapeutic Exercise  Therapeutic Exercise Activity 1: prone superman (UE only)  Therapeutic Exercise Activity 2: Prone hip IR with Red TB resistance    Manual Therapy  Manual Therapy Activity 1: (B) sciatic nerve glides  Manual Therapy Activity 2: Manual lumbar traction with SB and belt  Manual Therapy Activity 3: HS, piri, and hip add stretch (B)  Manual Therapy Activity 4: L hip LAD and L hip posterior mob  Manual Therapy Activity 5: R SL L lumbar gapping and QL stretch    Balance/Neuromuscular Re-Education  Balance/Neuromuscular Re-Education Activity 1: Core re-ed with supine abd brace with march  Balance/Neuromuscular Re-Education Activity 2: core and lumbo-pelvic re-ed with LTR on SB    Assessment & Plan    Assessment: The pt was found to have increased sciatic neural tension (B) (L>R). This was addressed by sciatic nerve glides (manually by PT and by pt herself). She had good response to L hip LAD and manual lumbar traction. She required slight manual and verbal cueing from PT in order to complete prone superman (UE only) and supine abd bracing correctly.       Patient will continue to benefit from skilled outpatient physical therapy to address the deficits listed in the problem list box on initial evaluation, provide pt/family education and to maximize pt's level of independence in the home and community environment.     Patient's spiritual, cultural, and educational needs considered and patient agreeable to plan of care and goals.     Education  Education was done with Patient. The patient's learning style includes Listening and Demonstration. The patient Demonstrates understanding and Verbalizes understanding.         The pt was educated on supine sciatic nerve glides to complete at home.       Plan: Ct with current POC.    Goals:   Active       PT goals       Pt will demonstrate knowledge and independent with HEP to continue with exercises outside of therapy to facilitate optimal overall improvements (Progressing)       Start:  02/18/25    Expected End:  03/28/25            Pt will improve functional score on lumbar FOTO by >20 points which indicates improved ability to perform daily tasks with less difficulty and pain (Progressing)       Start:  02/18/25    Expected End:  03/28/25            Reduce c/o pain in lumbar and L LE to <3/10 pain level to improve tolerance with daily activities (Progressing)       Start:  02/18/25    Expected End:  03/28/25            Improve strength in B LE to 5/5 MMT throughout in order to improve tolerance with overall functional mobility (Progressing)       Start:  02/18/25    Expected End:  03/28/25            Improve flexibility of B LE in order to reduce mechanical stressors  on the lumbar spine (Progressing)       Start:  02/18/25    Expected End:  03/28/25               continued PT goals       Pt will demonstrate proper bending and lifting techniques to reduce stress on lumbar spine (Progressing)       Start:  02/18/25    Expected End:  03/28/25            Improve core strength as evidence by able to carry / lift heavy objects with < 3/10 pain level (Progressing)       Start:  02/18/25    Expected End:  03/28/25                Henok Pavon, PT

## 2025-02-27 ENCOUNTER — CLINICAL SUPPORT (OUTPATIENT)
Dept: REHABILITATION | Facility: HOSPITAL | Age: 50
End: 2025-02-27
Payer: MEDICAID

## 2025-02-27 DIAGNOSIS — M79.7 FIBROMYALGIA: ICD-10-CM

## 2025-02-27 DIAGNOSIS — M54.42 CHRONIC MIDLINE LOW BACK PAIN WITH LEFT-SIDED SCIATICA: ICD-10-CM

## 2025-02-27 DIAGNOSIS — R29.898 IMPAIRED FLEXIBILITY OF LOWER EXTREMITY: ICD-10-CM

## 2025-02-27 DIAGNOSIS — G89.29 CHRONIC MIDLINE LOW BACK PAIN WITH LEFT-SIDED SCIATICA: ICD-10-CM

## 2025-02-27 DIAGNOSIS — R29.898 DECREASED STRENGTH OF TRUNK AND BACK: ICD-10-CM

## 2025-02-27 DIAGNOSIS — M54.9 TENDERNESS OF BACK: Primary | ICD-10-CM

## 2025-02-27 DIAGNOSIS — M53.86 DECREASED ROM OF LUMBAR SPINE: ICD-10-CM

## 2025-02-27 NOTE — PROGRESS NOTES
Outpatient Rehab    Physical Therapy Visit    Patient Name: Ramya Terrell  MRN: [id number]  YOB: 1975  Encounter Date: 2/27/2025    Therapy Diagnosis:   Encounter Diagnoses   Name Primary?    Tenderness of back Yes    Chronic midline low back pain with left-sided sciatica     Decreased strength of trunk and back     Impaired flexibility of lower extremity     Fibromyalgia     Decreased ROM of lumbar spine      Physician: Elham Richter F*    Physician Orders: Eval and Treat  Medical Diagnosis: M54.42- Chronic midline low back pain with left sided sciatica     PTA visits: 1/5  Visit # / Visits Authorized:  9/10   Date of Evaluation:  1/8/25  Insurance Authorization Period: 1/15/25 to 3/16/25  Plan of Care Certification:  1/15/25 to 3/16/25  RA / POC completed: RA-2/5/25, POC- 2/18/25  Next RA / POC due: 3/18/25     Time In: 0930   Time Out: 1015  Total Time: 45       FOTO:  Intake Score:  %  Survey Score 1:  %  Survey Score 2:  %         Subjective   Patient reports pain remains present, but she felt good after last session..  Pain reported as 3/10. L Lateral thigh and into L lateral ankle    Objective            Treatment:  Therapeutic Exercise  Therapeutic Exercise Activity 2: Prone hip IR with Red TB resistance  Therapeutic Exercise Activity 3: Lower extremity strengthening in supine (see flow sheet)  Therapeutic Exercise Activity 4: Nustep warm up (see flow sheet for details)    Manual Therapy  Manual Therapy Activity 1: (B) sciatic nerve glides  Manual Therapy Activity 3: HS, piri, and hip add stretch (B)  Manual Therapy Activity 4: L hip LAD and L hip posterior mob    Balance/Neuromuscular Re-Education  Balance/Neuromuscular Re-Education Activity 1: Core re-ed with supine abd brace with march / bird dog / dying bug  Balance/Neuromuscular Re-Education Activity 2: core and lumbo-pelvic re-ed with LTR on SB    Assessment & Plan   Assessment: Patient demonstrated a positive outcome after  today's session as evidenced by ability to complete all exercises with good effort and minimal reports of increased pain. She is making an expected progression toward goals as evidenced by ability to utilize HEP and self-nerve glides to mitigate pain. However she continues to exhibit deficits with intermittent low back pain, weak core, and decreased lumbar stabilization.  Evaluation/Treatment Tolerance: Patient tolerated treatment well    Patient will continue to benefit from skilled outpatient physical therapy to address the deficits listed in the problem list box on initial evaluation, provide pt/family education and to maximize pt's level of independence in the home and community environment.     Patient's spiritual, cultural, and educational needs considered and patient agreeable to plan of care and goals.           Plan: Ct with current POC.    Goals:   Active       PT goals       Pt will demonstrate knowledge and independent with HEP to continue with exercises outside of therapy to facilitate optimal overall improvements (Progressing)       Start:  02/18/25    Expected End:  03/28/25            Pt will improve functional score on lumbar FOTO by >20 points which indicates improved ability to perform daily tasks with less difficulty and pain (Progressing)       Start:  02/18/25    Expected End:  03/28/25            Reduce c/o pain in lumbar and L LE to <3/10 pain level to improve tolerance with daily activities (Progressing)       Start:  02/18/25    Expected End:  03/28/25            Improve strength in B LE to 5/5 MMT throughout in order to improve tolerance with overall functional mobility (Progressing)       Start:  02/18/25    Expected End:  03/28/25            Improve flexibility of B LE in order to reduce mechanical stressors on the lumbar spine (Progressing)       Start:  02/18/25    Expected End:  03/28/25               continued PT goals       Pt will demonstrate proper bending and lifting techniques  to reduce stress on lumbar spine (Progressing)       Start:  02/18/25    Expected End:  03/28/25            Improve core strength as evidence by able to carry / lift heavy objects with < 3/10 pain level (Progressing)       Start:  02/18/25    Expected End:  03/28/25                Jose Carlos Young, PTA

## 2025-03-12 ENCOUNTER — CLINICAL SUPPORT (OUTPATIENT)
Dept: REHABILITATION | Facility: HOSPITAL | Age: 50
End: 2025-03-12
Payer: MEDICAID

## 2025-03-12 DIAGNOSIS — M79.7 FIBROMYALGIA: ICD-10-CM

## 2025-03-12 DIAGNOSIS — M54.42 CHRONIC MIDLINE LOW BACK PAIN WITH LEFT-SIDED SCIATICA: ICD-10-CM

## 2025-03-12 DIAGNOSIS — M53.86 DECREASED ROM OF LUMBAR SPINE: Primary | ICD-10-CM

## 2025-03-12 DIAGNOSIS — R29.898 IMPAIRED FLEXIBILITY OF LOWER EXTREMITY: ICD-10-CM

## 2025-03-12 DIAGNOSIS — G89.29 CHRONIC MIDLINE LOW BACK PAIN WITH LEFT-SIDED SCIATICA: ICD-10-CM

## 2025-03-12 DIAGNOSIS — M54.9 TENDERNESS OF BACK: ICD-10-CM

## 2025-03-12 DIAGNOSIS — G89.29 CHRONIC GROIN PAIN, LEFT: ICD-10-CM

## 2025-03-12 DIAGNOSIS — R29.898 DECREASED STRENGTH OF TRUNK AND BACK: ICD-10-CM

## 2025-03-12 DIAGNOSIS — R10.32 CHRONIC GROIN PAIN, LEFT: ICD-10-CM

## 2025-03-12 PROCEDURE — 97110 THERAPEUTIC EXERCISES: CPT

## 2025-03-12 NOTE — PROGRESS NOTES
Outpatient Rehab    Physical Therapy Visit    Patient Name: Ramya Terrell  MRN: [id number]  YOB: 1975  Encounter Date: 3/12/2025    Therapy Diagnosis:   Encounter Diagnoses   Name Primary?    Decreased ROM of lumbar spine Yes    Chronic groin pain, left     Chronic midline low back pain with left-sided sciatica     Decreased strength of trunk and back     Fibromyalgia     Impaired flexibility of lower extremity     Tenderness of back      Physician: Elham Richter F*    Physician Orders: Eval and Treat  Medical Diagnosis: M54.42- Chronic midline low back pain with left sided sciatica     PTA visits: 1/5  Visit # / Visits Authorized:  10/10   Date of Evaluation:  1/8/25  Insurance Authorization Period: 1/15/25 to 3/16/25  Plan of Care Certification:  1/15/25 to 3/16/25  RA / POC completed: RA-2/5/25, POC- 2/18/25  Next RA / POC due: 3/18/25       Time In: 0930   Time Out: 1015  Total Time: 45       FOTO:  Intake Score: 34%  Survey Score 1: 46%  Survey Score 2:  %         Subjective   pt reports her pain is not too bad even though she has been up doing a lot already this morning. She continues to have back pain if she does too much in a day. She constantly rubs her legs as a habit. She performs some of HEP outside of therapy. She did not take anything for pain today.  Pain reported as 2/10. L LE    Objective            Treatment:  Therapeutic Exercise  Therapeutic Exercise Activity 1: core strengthening exercises (see exercise sheet)  Therapeutic Exercise Activity 2: hip and lumbar ROM / stretches (see exercise sheet)  Therapeutic Exercise Activity 3: Lower extremity strengthening in supine (see flow sheet)  Therapeutic Exercise Activity 4: Nustep warm up UE and LE (see flow sheet for details)    Manual Therapy  Manual Therapy Activity 1: L LE traction in ER, abd, flexion position (see exercise sheet)  Manual Therapy Activity 2: manual lumbar traction with SB (see exercise sheet)    Assessment  & Plan   Assessment: Pt performed all LE and core stretches and strengthening exercises with good effort able to perform exercises with addition of light resistance with continued cues required for proper technique. Pt had reduction of symptoms and pain with manual lumbar and L hip traction. Discussed importance of continuing with HEP daily while waiting on authorization of additional PT visits.  Evaluation/Treatment Tolerance: Patient tolerated treatment well    Patient will continue to benefit from skilled outpatient physical therapy to address the deficits listed in the problem list box on initial evaluation, provide pt/family education and to maximize pt's level of independence in the home and community environment.     Patient's spiritual, cultural, and educational needs considered and patient agreeable to plan of care and goals.           Plan: waiting on authorization of additional PT visits    Goals:   Active       PT goals       Pt will demonstrate knowledge and independent with HEP to continue with exercises outside of therapy to facilitate optimal overall improvements (Progressing)       Start:  02/18/25    Expected End:  03/28/25            Pt will improve functional score on lumbar FOTO by >20 points which indicates improved ability to perform daily tasks with less difficulty and pain (Progressing)       Start:  02/18/25    Expected End:  03/28/25            Reduce c/o pain in lumbar and L LE to <3/10 pain level to improve tolerance with daily activities (Progressing)       Start:  02/18/25    Expected End:  03/28/25            Improve strength in B LE to 5/5 MMT throughout in order to improve tolerance with overall functional mobility (Progressing)       Start:  02/18/25    Expected End:  03/28/25            Improve flexibility of B LE in order to reduce mechanical stressors on the lumbar spine (Progressing)       Start:  02/18/25    Expected End:  03/28/25               continued PT goals       Pt  will demonstrate proper bending and lifting techniques to reduce stress on lumbar spine (Progressing)       Start:  02/18/25    Expected End:  03/28/25            Improve core strength as evidence by able to carry / lift heavy objects with < 3/10 pain level (Progressing)       Start:  02/18/25    Expected End:  03/28/25                Massiel Dumas, PT

## 2025-04-07 ENCOUNTER — TELEPHONE (OUTPATIENT)
Dept: FAMILY MEDICINE | Facility: CLINIC | Age: 50
End: 2025-04-07
Payer: MEDICAID

## 2025-04-15 ENCOUNTER — HOSPITAL ENCOUNTER (OUTPATIENT)
Dept: RADIOLOGY | Facility: HOSPITAL | Age: 50
Discharge: HOME | End: 2025-04-15
Attending: NURSE PRACTITIONER
Payer: MEDICAID

## 2025-04-15 DIAGNOSIS — Z12.31 BREAST CANCER SCREENING BY MAMMOGRAM: ICD-10-CM

## 2025-04-15 PROCEDURE — 77067 SCR MAMMO BI INCL CAD: CPT | Mod: TC

## 2025-04-15 PROCEDURE — 77067 SCR MAMMO BI INCL CAD: CPT | Mod: 26,,, | Performed by: STUDENT IN AN ORGANIZED HEALTH CARE EDUCATION/TRAINING PROGRAM

## 2025-04-15 PROCEDURE — 77063 BREAST TOMOSYNTHESIS BI: CPT | Mod: 26,,, | Performed by: STUDENT IN AN ORGANIZED HEALTH CARE EDUCATION/TRAINING PROGRAM

## 2025-04-16 ENCOUNTER — TELEPHONE (OUTPATIENT)
Dept: FAMILY MEDICINE | Facility: CLINIC | Age: 50
End: 2025-04-16

## 2025-04-16 ENCOUNTER — OFFICE VISIT (OUTPATIENT)
Dept: FAMILY MEDICINE | Facility: CLINIC | Age: 50
End: 2025-04-16
Payer: MEDICAID

## 2025-04-16 ENCOUNTER — RESULTS FOLLOW-UP (OUTPATIENT)
Dept: FAMILY MEDICINE | Facility: CLINIC | Age: 50
End: 2025-04-16

## 2025-04-16 VITALS
DIASTOLIC BLOOD PRESSURE: 76 MMHG | SYSTOLIC BLOOD PRESSURE: 108 MMHG | RESPIRATION RATE: 18 BRPM | BODY MASS INDEX: 39.77 KG/M2 | OXYGEN SATURATION: 96 % | HEIGHT: 65 IN | HEART RATE: 109 BPM | WEIGHT: 238.69 LBS | TEMPERATURE: 98 F

## 2025-04-16 DIAGNOSIS — M54.50 CHRONIC BILATERAL LOW BACK PAIN WITHOUT SCIATICA: ICD-10-CM

## 2025-04-16 DIAGNOSIS — G89.29 CHRONIC BILATERAL LOW BACK PAIN WITHOUT SCIATICA: ICD-10-CM

## 2025-04-16 DIAGNOSIS — M79.661 RIGHT CALF PAIN: Primary | ICD-10-CM

## 2025-04-16 PROCEDURE — 3008F BODY MASS INDEX DOCD: CPT | Mod: CPTII,,, | Performed by: NURSE PRACTITIONER

## 2025-04-16 PROCEDURE — 1160F RVW MEDS BY RX/DR IN RCRD: CPT | Mod: CPTII,,, | Performed by: NURSE PRACTITIONER

## 2025-04-16 PROCEDURE — 3074F SYST BP LT 130 MM HG: CPT | Mod: CPTII,,, | Performed by: NURSE PRACTITIONER

## 2025-04-16 PROCEDURE — 1159F MED LIST DOCD IN RCRD: CPT | Mod: CPTII,,, | Performed by: NURSE PRACTITIONER

## 2025-04-16 PROCEDURE — G2211 COMPLEX E/M VISIT ADD ON: HCPCS | Mod: ,,, | Performed by: NURSE PRACTITIONER

## 2025-04-16 PROCEDURE — 3078F DIAST BP <80 MM HG: CPT | Mod: CPTII,,, | Performed by: NURSE PRACTITIONER

## 2025-04-16 PROCEDURE — 99213 OFFICE O/P EST LOW 20 MIN: CPT | Mod: ,,, | Performed by: NURSE PRACTITIONER

## 2025-04-16 PROCEDURE — 4010F ACE/ARB THERAPY RXD/TAKEN: CPT | Mod: CPTII,,, | Performed by: NURSE PRACTITIONER

## 2025-04-16 RX ORDER — IBUPROFEN 800 MG/1
800 TABLET ORAL EVERY 8 HOURS PRN
Qty: 30 TABLET | Refills: 0 | Status: SHIPPED | OUTPATIENT
Start: 2025-04-16

## 2025-04-16 RX ORDER — MELOXICAM 15 MG/1
15 TABLET ORAL DAILY
Qty: 30 TABLET | Refills: 3 | Status: SHIPPED | OUTPATIENT
Start: 2025-04-16

## 2025-04-16 NOTE — TELEPHONE ENCOUNTER
Copied from CRM #[id number]. Topic: General Inquiry - Patient Advice  >> Apr 16, 2025  7:37 AM Dania wrote:  Who Called: daughter Vikram in regards to Ramya Hollowayet    Caller is requesting assistance/information from provider's office.    Symptoms (please be specific): na   How long has patient had these symptoms:  na  List of preferred pharmacies on file (remove unneeded): [unfilled]  If different, enter pharmacy into here including location and phone number: na      Preferred Method of Contact: Phone Call  Patient's Preferred Phone Number on File: [phone]   Best Call Back Number, if different:**[phone] meseret Sue**  Additional Information: medical advice, pt meseret uSe called to confirm pt will accept appt @ 1PM today,  please be advised , thanks

## 2025-04-17 ENCOUNTER — DOCUMENTATION ONLY (OUTPATIENT)
Dept: REHABILITATION | Facility: HOSPITAL | Age: 50
End: 2025-04-17
Payer: MEDICAID

## 2025-04-17 NOTE — PROGRESS NOTES
[facility]  Physical Therapy Discharge Note    Name: Ramya Terrell  Clinic Number: [id number]    Medical diagnosis: M54.42- Chronic midline low back pain with left sided sciatica   PT diagnosis: decreased lumbar ROM, chronic left groin pain, chronic LBP, decreased core strength, fibromyalgia, muscle tightness, tenderness in back    Date of Last visit: 3/12/25  Total Visits Received: 10 plus initial eval    Assessment      Ramya Terrell did not return to physical therapy today for final assessment for discharge.  Ramya goals were address as listed below and met as stated.  Ramya was issued a home exercise program with handouts throughout this episode of care to reference for continued wellness and physical fitness . Contact information was given at evaluation in case any questions arise in the future or if therapy is needed.    Discharge reason: patient did not return for formal physical therapy. Additional PT visits requested however >30 days so a new MD order and eval would be needed to resume therapy    Plan   This patient is discharged from Physical Therapy.

## 2025-04-21 ENCOUNTER — HOSPITAL ENCOUNTER (OUTPATIENT)
Dept: RADIOLOGY | Facility: HOSPITAL | Age: 50
Discharge: HOME | End: 2025-04-21
Attending: NURSE PRACTITIONER
Payer: MEDICAID

## 2025-04-21 DIAGNOSIS — M79.661 RIGHT CALF PAIN: ICD-10-CM

## 2025-04-21 PROCEDURE — 93971 EXTREMITY STUDY: CPT | Mod: TC,RT

## 2025-04-22 ENCOUNTER — RESULTS FOLLOW-UP (OUTPATIENT)
Dept: FAMILY MEDICINE | Facility: CLINIC | Age: 50
End: 2025-04-22

## 2025-05-29 ENCOUNTER — TELEPHONE (OUTPATIENT)
Dept: FAMILY MEDICINE | Facility: CLINIC | Age: 50
End: 2025-05-29
Payer: MEDICAID

## 2025-05-29 DIAGNOSIS — R73.03 PRE-DIABETES: Primary | ICD-10-CM

## 2025-05-29 DIAGNOSIS — E78.2 MIXED HYPERLIPIDEMIA: ICD-10-CM

## 2025-06-03 ENCOUNTER — RESULTS FOLLOW-UP (OUTPATIENT)
Dept: FAMILY MEDICINE | Facility: CLINIC | Age: 50
End: 2025-06-03

## 2025-06-03 ENCOUNTER — LAB VISIT (OUTPATIENT)
Dept: LAB | Facility: HOSPITAL | Age: 50
End: 2025-06-03
Attending: NURSE PRACTITIONER
Payer: MEDICAID

## 2025-06-03 DIAGNOSIS — R73.03 PRE-DIABETES: ICD-10-CM

## 2025-06-03 DIAGNOSIS — E78.2 MIXED HYPERLIPIDEMIA: ICD-10-CM

## 2025-06-03 LAB
ALBUMIN SERPL-MCNC: 3.7 G/DL (ref 3.5–5)
ALBUMIN/GLOB SERPL: 0.8 RATIO (ref 1.1–2)
ALP SERPL-CCNC: 122 UNIT/L (ref 40–150)
ALT SERPL-CCNC: 22 UNIT/L (ref 0–55)
ANION GAP SERPL CALC-SCNC: 7 MEQ/L
AST SERPL-CCNC: 20 UNIT/L (ref 11–45)
BILIRUB SERPL-MCNC: 0.6 MG/DL
BUN SERPL-MCNC: 10 MG/DL (ref 9.8–20.1)
CALCIUM SERPL-MCNC: 9.6 MG/DL (ref 8.4–10.2)
CHLORIDE SERPL-SCNC: 104 MMOL/L (ref 98–107)
CHOLEST SERPL-MCNC: 171 MG/DL
CHOLEST/HDLC SERPL: 3 {RATIO} (ref 0–5)
CO2 SERPL-SCNC: 30 MMOL/L (ref 22–29)
CREAT SERPL-MCNC: 0.7 MG/DL (ref 0.55–1.02)
CREAT/UREA NIT SERPL: 14
EST. AVERAGE GLUCOSE BLD GHB EST-MCNC: 122.6 MG/DL
GFR SERPLBLD CREATININE-BSD FMLA CKD-EPI: >60 ML/MIN/1.73/M2
GLOBULIN SER-MCNC: 4.5 GM/DL (ref 2.4–3.5)
GLUCOSE SERPL-MCNC: 106 MG/DL (ref 74–100)
HBA1C MFR BLD: 5.9 %
HDLC SERPL-MCNC: 52 MG/DL (ref 35–60)
LDLC SERPL CALC-MCNC: 98 MG/DL (ref 50–140)
POTASSIUM SERPL-SCNC: 4.2 MMOL/L (ref 3.5–5.1)
PROT SERPL-MCNC: 8.2 GM/DL (ref 6.4–8.3)
SODIUM SERPL-SCNC: 141 MMOL/L (ref 136–145)
TRIGL SERPL-MCNC: 104 MG/DL (ref 37–140)
VLDLC SERPL CALC-MCNC: 21 MG/DL

## 2025-06-03 PROCEDURE — 36415 COLL VENOUS BLD VENIPUNCTURE: CPT

## 2025-06-03 PROCEDURE — 80053 COMPREHEN METABOLIC PANEL: CPT

## 2025-06-03 PROCEDURE — 80061 LIPID PANEL: CPT

## 2025-06-03 PROCEDURE — 83036 HEMOGLOBIN GLYCOSYLATED A1C: CPT

## 2025-06-05 ENCOUNTER — OFFICE VISIT (OUTPATIENT)
Dept: FAMILY MEDICINE | Facility: CLINIC | Age: 50
End: 2025-06-05
Payer: MEDICAID

## 2025-06-05 VITALS
SYSTOLIC BLOOD PRESSURE: 126 MMHG | WEIGHT: 233.31 LBS | OXYGEN SATURATION: 98 % | HEIGHT: 65 IN | BODY MASS INDEX: 38.87 KG/M2 | HEART RATE: 91 BPM | RESPIRATION RATE: 18 BRPM | TEMPERATURE: 98 F | DIASTOLIC BLOOD PRESSURE: 85 MMHG

## 2025-06-05 DIAGNOSIS — M25.551 RIGHT HIP PAIN: ICD-10-CM

## 2025-06-05 DIAGNOSIS — M65.341 TRIGGER RING FINGER OF RIGHT HAND: ICD-10-CM

## 2025-06-05 DIAGNOSIS — E66.9 OBESITY, UNSPECIFIED CLASS, UNSPECIFIED OBESITY TYPE, UNSPECIFIED WHETHER SERIOUS COMORBIDITY PRESENT: ICD-10-CM

## 2025-06-05 DIAGNOSIS — E78.5 HYPERLIPIDEMIA, UNSPECIFIED HYPERLIPIDEMIA TYPE: Primary | ICD-10-CM

## 2025-06-05 DIAGNOSIS — R73.9 HYPERGLYCEMIA: ICD-10-CM

## 2025-06-05 RX ORDER — METHOCARBAMOL 500 MG/1
500 TABLET, FILM COATED ORAL EVERY 6 HOURS PRN
Qty: 40 TABLET | Refills: 0 | Status: SHIPPED | OUTPATIENT
Start: 2025-06-05 | End: 2025-06-15

## 2025-06-05 RX ORDER — DICLOFENAC SODIUM 75 MG/1
75 TABLET, DELAYED RELEASE ORAL 2 TIMES DAILY PRN
Qty: 60 TABLET | Refills: 3 | Status: SHIPPED | OUTPATIENT
Start: 2025-06-05

## 2025-06-06 DIAGNOSIS — I10 HYPERTENSION, UNSPECIFIED TYPE: ICD-10-CM

## 2025-06-06 RX ORDER — AMLODIPINE BESYLATE 5 MG/1
5 TABLET ORAL DAILY
Qty: 30 TABLET | Refills: 3 | Status: SHIPPED | OUTPATIENT
Start: 2025-06-06 | End: 2026-06-06

## 2025-07-08 NOTE — PROGRESS NOTES
"Subjective:    Patient ID: Ramya Terrell is a right handed 50 y.o. female  who presented to [facility] for new visit..      Chief Complaint: Pain of the Right Hand      History of Present Illness:    Ramya Terrell is a 50-year-old female who presents to the clinic today for evaluation of catching locking of her right ring finger and her right thumb.  She reports that he ring finger has been going on for the past 2 months but the thumb has been only been going on for the past few weeks.  She rates her pain today as a 9/10 worse with any use of her thumb or her ring finger.  She denies any numbness and tingling at this time.  She describes the pain as dull and achy and sharp whenever the finger does lock.  She has never had any types of injections into her hands.    Hand Review of Systems:  Swelling?  no  Instability?  no  Clicking?  no  Limited ROM? no  Fever/Chills? no  Subluxation? no  Dislocation? no  Numbness/Tingling? no  Weakness? no       Objective:      Physical Exam:    BP (!) 139/99 Comment: Pt did not take BP medication this morning  Pulse 91   Ht 5' 5" (1.651 m)   Wt 103 kg (227 lb)   LMP  (LMP Unknown)   BMI 37.77 kg/m²     Ortho/SPM Exam    Appearance:  Soft tissue swelling: Left: no Right: no  Effusion: Left:  Negative Right: Negative  Erythema: Left no Right: no  Ecchymosis: Left: no Right: no  Atrophy: Left: no Right: no    Palpation:  Hand/wrist Tenderness: Left: none  Right: A1 pulley thumb and ring finger    Range of motion:  Flexion (0-80): Left:  80 Right: 80  Extension (0-70): Left:  70 Right: 70  Ulnar deviation (0-30): 30 Right: 30  Radial deviation (0-20): 20 Right: 20  Supination (0-90): Left: 90 Right: 90  Pronation (0-90): Left: 90 Right: 90  Able to make a power fist and claw hand: on Both hand(s)  Distal palmar crease-finger tip distance: 0 on Both hand(s)    Strength:  Flexion: Left: 5/5 Pain: no Right: 5/5 Pain: no  Extension: " Left: 5/5 Pain: no Right: 5/5 Pain: no  Supination: Left: 5/5 Pain: no Right: 5/5 Pain: no  Pronation: Left: 5/5 Pain: no Right: 5/5 Pain: no  Ulnar deviation: Left: 5/5 Pain: no Right: 5/5 Pain: no  Radial deviation: Left: 5/5 Pain: no Right: 5/5 Pain: no    Special Tests:  Durkans Test (Carpal Compression test): Left: Negative  Right: Negative  FDP test: Left: Negative  Right: Negative  FDS test: Left: Negative  Right: Negative  Reverse Phalens: Left: Not performed Right: Not performed  Finkelstein's Test: Left: Negative Right: Negative    Froments: Left: Negative Right: Negative      AIN/PIN/Ulnar nerve: Intact and symmetric    Neurovascular Exam  General appearance: NAD  Peripheral pulses: normal bilaterally   Reflexes: Left: Not performed Right: Not performed   Sensation: normal  Median,Radial,Ulnar, Anterior Interosseus    Labs:  Last A1c: 5.9     Imaging:   Previous images reviewed.  X-rays ordered and performed today: yes  # of views: 3 Laterality: right  My Interpretation:  Distal Radial ulnar joint space is overall Normal on AP views. Scapholunate interval distance is Normal on right AP views. A DISI/VISI is not suggested on right hand series. Negative/positive ulnar variance is not suggested on lright lateral views.  no fracture, dislocation, swelling or degenerative changes noted          Assessment:        Encounter Diagnoses   Code Name Primary?    M65.311 Trigger finger of right thumb Yes        Plan:   MDM: Prior external referring provider notes reviewed. Prior external referring provider studies reviewed.   Dx:  Right trigger thumb, new problem, right ring trigger finger,  new problem  Treatment Plan: Discussed with patient diagnosis and treatment recommendations. Handout given. Recommend conservative treatment to include: avoidance of aggravating activity, significant modification of daily activities, hot/cold therapies, topical and oral medications, braces, HEP/PT/OT, and injections.   Patient  with a trigger finger of the thumb and the ring finger on the right hand.  We will give patient a corticosteroid injection into her right thumb at this time.  She can continue using oral and topical medications for pain relief.  We will see patient back in about 6 weeks to for re-evaluation and possible injection into her right ring finger.  Imaging: radiological studies ordered and independently reviewed; discussed with patient; agree with radiologist interpretation.   Procedure: Discussed CSI as treatment options; discussed injections as treatment options; since conservative measures did not improve symptoms patient consented for CSI today.  Activity: Activity as tolerated  Therapy: No formal therapy  Medication: CONTINUE over-the-counter acetaminophen (Tylenol 1000 mg three times per day as needed)  CONTINUE Voltaren Gel 1% as prescribed  CONTINUE over-the-counter NSAIDs (ibuprofen 200mg three tablets three times a day as needed). Please see your primary care physician for further refills.  RTC: 4-6 weeks for trigger ring finger.         Tendon Sheath    Date/Time: 7/9/2025 7:50 AM    Performed by: Jamaal Nassar DO  Authorized by: Jamaal Nassar, DO    Consent Done?:  Yes (Written)  Indications:  Pain  Timeout: prior to procedure the correct patient, procedure, and site was verified    Local anesthesia used?: Yes    Local anesthetic:  Topical anesthetic  Location:  Thumb  Site:  R thumb flexor tendon sheath  Ultrasonic guidance for needle placement?: No    Needle size:  27 G  Approach:  Plantar  Patient tolerance:  Patient tolerated the procedure well with no immediate complications    Staff Attending: Mark Aguilera MD    Risks:  Possible complications with the injection include bleeding, infection (.01%), tendon rupture, steroid flare, fat pad or soft tissue atrophy, skin depigmentation, allergic reaction to medications and vasovagal response. (steroid flare treatment is rest, ice, NSAIDs and resolves in 24-36  hours.)    Consent:  No absolute contraindications (cellulitis overlying joint, infection, lack of informed consent, allergy to injection medication, AVN protein or egg allergy for sodium hyaluronate, or history of steroid flare) or relative contraindications (uncontrolled DM2 A1c>10, coagulopathy, INR > 3.5, previous joint replacement or history of AVN).        Description:  The patient was prepped in normal sterile fashion use of chlorhexidine scrub and the appropriate and anatomic landmarks were identified without image guidance. Care was taken to ensure there was unrestricted flow of syringe contents (listed below) into the site of injection. .           Body mass index is 37.77 kg/m².    Contents of syringe included: 1/2 mL of 1% lidocaine with 4mg of Decadron/dexamethasone (1/2 mL of 4mg/mL)    Post Procedure: Patient alert, and moving all extremities. ROM improved, pain decreased.  Good peripheral pulses, no signs of vascular compromise and range of motion intact.  Aftercare instructions were given to patient at time of discharge.  Relative rest for 3 days-avoiding excess activity.  Place ice on the area for 15 minutes every 4-6 hours. Patient may take Tylenol a 1000 mg b.i.d. or ibuprofen 600 mg t.i.d. for the next 3-4 days if not on medication already and safe to take pending co-morbidities.  Protect the area for the next 1-8 hours if anesthetic was used.  Avoid excessive activity for the next 3-4 weeks.  ER precautions given for fever, severe joint pain or allergic reaction or other new symptoms related to the joint injection.         Jamaal Nassar D.O.  Sports Medicine Fellow

## 2025-07-09 ENCOUNTER — HOSPITAL ENCOUNTER (OUTPATIENT)
Dept: RADIOLOGY | Facility: HOSPITAL | Age: 50
Discharge: HOME | End: 2025-07-09
Attending: STUDENT IN AN ORGANIZED HEALTH CARE EDUCATION/TRAINING PROGRAM
Payer: MEDICAID

## 2025-07-09 ENCOUNTER — OFFICE VISIT (OUTPATIENT)
Dept: ORTHOPEDICS | Facility: CLINIC | Age: 50
End: 2025-07-09
Payer: MEDICAID

## 2025-07-09 VITALS
HEIGHT: 65 IN | SYSTOLIC BLOOD PRESSURE: 139 MMHG | BODY MASS INDEX: 37.82 KG/M2 | DIASTOLIC BLOOD PRESSURE: 99 MMHG | WEIGHT: 227 LBS | HEART RATE: 91 BPM

## 2025-07-09 DIAGNOSIS — M65.341 TRIGGER RING FINGER OF RIGHT HAND: ICD-10-CM

## 2025-07-09 DIAGNOSIS — I10 HYPERTENSION, UNSPECIFIED TYPE: ICD-10-CM

## 2025-07-09 DIAGNOSIS — M65.311 TRIGGER FINGER OF RIGHT THUMB: Primary | ICD-10-CM

## 2025-07-09 PROCEDURE — 99214 OFFICE O/P EST MOD 30 MIN: CPT | Mod: PBBFAC,25 | Performed by: STUDENT IN AN ORGANIZED HEALTH CARE EDUCATION/TRAINING PROGRAM

## 2025-07-09 PROCEDURE — 20550 NJX 1 TENDON SHEATH/LIGAMENT: CPT | Mod: PBBFAC,RT | Performed by: STUDENT IN AN ORGANIZED HEALTH CARE EDUCATION/TRAINING PROGRAM

## 2025-07-09 PROCEDURE — 73130 X-RAY EXAM OF HAND: CPT | Mod: TC,RT

## 2025-07-09 RX ORDER — DEXAMETHASONE SODIUM PHOSPHATE 4 MG/ML
4 INJECTION, SOLUTION INTRA-ARTICULAR; INTRALESIONAL; INTRAMUSCULAR; INTRAVENOUS; SOFT TISSUE
Status: COMPLETED | OUTPATIENT
Start: 2025-07-09 | End: 2025-07-09

## 2025-07-09 RX ORDER — MELOXICAM 15 MG/1
15 TABLET ORAL
COMMUNITY
Start: 2025-06-30

## 2025-07-09 RX ORDER — LIDOCAINE HYDROCHLORIDE 10 MG/ML
1 INJECTION, SOLUTION EPIDURAL; INFILTRATION; INTRACAUDAL; PERINEURAL
Status: COMPLETED | OUTPATIENT
Start: 2025-07-09 | End: 2025-07-09

## 2025-07-09 RX ADMIN — LIDOCAINE HYDROCHLORIDE 10 MG: 10 INJECTION, SOLUTION EPIDURAL; INFILTRATION; INTRACAUDAL; PERINEURAL at 01:07

## 2025-07-09 RX ADMIN — DEXAMETHASONE SODIUM PHOSPHATE 4 MG: 4 INJECTION, SOLUTION INTRA-ARTICULAR; INTRALESIONAL; INTRAMUSCULAR; INTRAVENOUS; SOFT TISSUE at 01:07

## 2025-07-10 RX ORDER — BENAZEPRIL HYDROCHLORIDE 40 MG/1
40 TABLET ORAL
Qty: 30 TABLET | Refills: 3 | Status: SHIPPED | OUTPATIENT
Start: 2025-07-10

## 2025-07-10 NOTE — PROGRESS NOTES
Faculty Attestation: Ramya Terrell  was seen in [facility] Discussed with Dr. Nassar at the time of the visit. History of Present Illness, Physical Exam, and Assessment and Plan reviewed.     Treatment plan is reasonable and appropriate. Compliance with treatment recommendations is important.      Radiology images independently reviewed and agree with fellow interpretation.     Procedure note reviewed. Patient tolerated procedure well.     Mark Aguilera MD  Sports Medicine

## 2025-08-12 ENCOUNTER — PATIENT MESSAGE (OUTPATIENT)
Facility: CLINIC | Age: 50
End: 2025-08-12
Payer: MEDICAID

## 2025-08-20 ENCOUNTER — TELEPHONE (OUTPATIENT)
Dept: FAMILY MEDICINE | Facility: CLINIC | Age: 50
End: 2025-08-20
Payer: MEDICAID

## 2025-08-21 ENCOUNTER — OFFICE VISIT (OUTPATIENT)
Dept: FAMILY MEDICINE | Facility: CLINIC | Age: 50
End: 2025-08-21
Payer: MEDICAID

## 2025-08-21 VITALS
TEMPERATURE: 98 F | SYSTOLIC BLOOD PRESSURE: 118 MMHG | HEART RATE: 98 BPM | OXYGEN SATURATION: 97 % | DIASTOLIC BLOOD PRESSURE: 84 MMHG | WEIGHT: 226.88 LBS | HEIGHT: 65 IN | RESPIRATION RATE: 18 BRPM | BODY MASS INDEX: 37.8 KG/M2

## 2025-08-21 DIAGNOSIS — Z09 HOSPITAL DISCHARGE FOLLOW-UP: Primary | ICD-10-CM

## 2025-08-21 DIAGNOSIS — N83.201 RIGHT OVARIAN CYST: ICD-10-CM

## 2025-08-21 PROCEDURE — 4010F ACE/ARB THERAPY RXD/TAKEN: CPT | Mod: CPTII,,, | Performed by: NURSE PRACTITIONER

## 2025-08-21 PROCEDURE — 3074F SYST BP LT 130 MM HG: CPT | Mod: CPTII,,, | Performed by: NURSE PRACTITIONER

## 2025-08-21 PROCEDURE — 3079F DIAST BP 80-89 MM HG: CPT | Mod: CPTII,,, | Performed by: NURSE PRACTITIONER

## 2025-08-21 PROCEDURE — 99213 OFFICE O/P EST LOW 20 MIN: CPT | Mod: ,,, | Performed by: NURSE PRACTITIONER

## 2025-08-21 PROCEDURE — 1159F MED LIST DOCD IN RCRD: CPT | Mod: CPTII,,, | Performed by: NURSE PRACTITIONER

## 2025-08-21 PROCEDURE — 3008F BODY MASS INDEX DOCD: CPT | Mod: CPTII,,, | Performed by: NURSE PRACTITIONER

## 2025-08-21 PROCEDURE — G2211 COMPLEX E/M VISIT ADD ON: HCPCS | Mod: ,,, | Performed by: NURSE PRACTITIONER

## 2025-08-21 PROCEDURE — 3044F HG A1C LEVEL LT 7.0%: CPT | Mod: CPTII,,, | Performed by: NURSE PRACTITIONER

## 2025-08-21 PROCEDURE — 1160F RVW MEDS BY RX/DR IN RCRD: CPT | Mod: CPTII,,, | Performed by: NURSE PRACTITIONER

## 2025-08-21 RX ORDER — BUSPIRONE HYDROCHLORIDE 10 MG/1
10 TABLET ORAL 2 TIMES DAILY
COMMUNITY
Start: 2025-04-21